# Patient Record
Sex: FEMALE | Race: OTHER | NOT HISPANIC OR LATINO
[De-identification: names, ages, dates, MRNs, and addresses within clinical notes are randomized per-mention and may not be internally consistent; named-entity substitution may affect disease eponyms.]

---

## 2017-07-24 PROBLEM — Z00.00 ENCOUNTER FOR PREVENTIVE HEALTH EXAMINATION: Status: ACTIVE | Noted: 2017-07-24

## 2019-09-17 ENCOUNTER — TRANSCRIPTION ENCOUNTER (OUTPATIENT)
Age: 29
End: 2019-09-17

## 2019-12-01 ENCOUNTER — TRANSCRIPTION ENCOUNTER (OUTPATIENT)
Age: 29
End: 2019-12-01

## 2021-06-30 ENCOUNTER — TRANSCRIPTION ENCOUNTER (OUTPATIENT)
Age: 31
End: 2021-06-30

## 2021-08-04 ENCOUNTER — TRANSCRIPTION ENCOUNTER (OUTPATIENT)
Age: 31
End: 2021-08-04

## 2021-09-01 ENCOUNTER — TRANSCRIPTION ENCOUNTER (OUTPATIENT)
Age: 31
End: 2021-09-01

## 2021-09-24 ENCOUNTER — INPATIENT (INPATIENT)
Facility: HOSPITAL | Age: 31
LOS: 4 days | Discharge: HOME | DRG: 897 | End: 2021-09-29
Attending: INTERNAL MEDICINE | Admitting: INTERNAL MEDICINE
Payer: MEDICAID

## 2021-09-24 VITALS
HEIGHT: 63 IN | WEIGHT: 169.98 LBS | RESPIRATION RATE: 16 BRPM | OXYGEN SATURATION: 99 % | SYSTOLIC BLOOD PRESSURE: 151 MMHG | TEMPERATURE: 98 F | DIASTOLIC BLOOD PRESSURE: 94 MMHG | HEART RATE: 83 BPM

## 2021-09-24 DIAGNOSIS — G43.909 MIGRAINE, UNSPECIFIED, NOT INTRACTABLE, WITHOUT STATUS MIGRAINOSUS: ICD-10-CM

## 2021-09-24 DIAGNOSIS — Z98.890 OTHER SPECIFIED POSTPROCEDURAL STATES: Chronic | ICD-10-CM

## 2021-09-24 DIAGNOSIS — Z29.9 ENCOUNTER FOR PROPHYLACTIC MEASURES, UNSPECIFIED: ICD-10-CM

## 2021-09-24 DIAGNOSIS — R74.01 ELEVATION OF LEVELS OF LIVER TRANSAMINASE LEVELS: ICD-10-CM

## 2021-09-24 DIAGNOSIS — F10.239 ALCOHOL DEPENDENCE WITH WITHDRAWAL, UNSPECIFIED: ICD-10-CM

## 2021-09-24 LAB
ALBUMIN SERPL ELPH-MCNC: 4.4 G/DL — SIGNIFICANT CHANGE UP (ref 3.5–5)
ALP SERPL-CCNC: 100 U/L — SIGNIFICANT CHANGE UP (ref 40–120)
ALT FLD-CCNC: 168 U/L DA — HIGH (ref 10–60)
ANION GAP SERPL CALC-SCNC: 26 MMOL/L — HIGH (ref 5–17)
AST SERPL-CCNC: 240 U/L — HIGH (ref 10–40)
BILIRUB SERPL-MCNC: 1.4 MG/DL — HIGH (ref 0.2–1.2)
BUN SERPL-MCNC: 10 MG/DL — SIGNIFICANT CHANGE UP (ref 7–18)
CALCIUM SERPL-MCNC: 9.7 MG/DL — SIGNIFICANT CHANGE UP (ref 8.4–10.5)
CHLORIDE SERPL-SCNC: 93 MMOL/L — LOW (ref 96–108)
CO2 SERPL-SCNC: 14 MMOL/L — LOW (ref 22–31)
CREAT SERPL-MCNC: 0.67 MG/DL — SIGNIFICANT CHANGE UP (ref 0.5–1.3)
ETHANOL SERPL-MCNC: <3 MG/DL — SIGNIFICANT CHANGE UP (ref 0–10)
GLUCOSE SERPL-MCNC: 87 MG/DL — SIGNIFICANT CHANGE UP (ref 70–99)
HCG UR QL: NEGATIVE — SIGNIFICANT CHANGE UP
HCT VFR BLD CALC: 43.2 % — SIGNIFICANT CHANGE UP (ref 34.5–45)
HGB BLD-MCNC: 14.2 G/DL — SIGNIFICANT CHANGE UP (ref 11.5–15.5)
LACTATE SERPL-SCNC: 2.2 MMOL/L — HIGH (ref 0.7–2)
LIDOCAIN IGE QN: 69 U/L — LOW (ref 73–393)
MAGNESIUM SERPL-MCNC: 1.9 MG/DL — SIGNIFICANT CHANGE UP (ref 1.6–2.6)
MCHC RBC-ENTMCNC: 32.4 PG — SIGNIFICANT CHANGE UP (ref 27–34)
MCHC RBC-ENTMCNC: 32.9 GM/DL — SIGNIFICANT CHANGE UP (ref 32–36)
MCV RBC AUTO: 98.6 FL — SIGNIFICANT CHANGE UP (ref 80–100)
NRBC # BLD: 0 /100 WBCS — SIGNIFICANT CHANGE UP (ref 0–0)
PHOSPHATE SERPL-MCNC: 4.5 MG/DL — SIGNIFICANT CHANGE UP (ref 2.5–4.5)
PLATELET # BLD AUTO: 347 K/UL — SIGNIFICANT CHANGE UP (ref 150–400)
POTASSIUM SERPL-MCNC: 4.1 MMOL/L — SIGNIFICANT CHANGE UP (ref 3.5–5.3)
POTASSIUM SERPL-SCNC: 4.1 MMOL/L — SIGNIFICANT CHANGE UP (ref 3.5–5.3)
PROT SERPL-MCNC: 9.6 G/DL — HIGH (ref 6–8.3)
RBC # BLD: 4.38 M/UL — SIGNIFICANT CHANGE UP (ref 3.8–5.2)
RBC # FLD: 12.4 % — SIGNIFICANT CHANGE UP (ref 10.3–14.5)
SARS-COV-2 RNA SPEC QL NAA+PROBE: SIGNIFICANT CHANGE UP
SODIUM SERPL-SCNC: 133 MMOL/L — LOW (ref 135–145)
WBC # BLD: 11.86 K/UL — HIGH (ref 3.8–10.5)
WBC # FLD AUTO: 11.86 K/UL — HIGH (ref 3.8–10.5)

## 2021-09-24 PROCEDURE — 76705 ECHO EXAM OF ABDOMEN: CPT | Mod: 26

## 2021-09-24 PROCEDURE — 99233 SBSQ HOSP IP/OBS HIGH 50: CPT | Mod: GC

## 2021-09-24 PROCEDURE — 99285 EMERGENCY DEPT VISIT HI MDM: CPT

## 2021-09-24 RX ORDER — FAMOTIDINE 10 MG/ML
20 INJECTION INTRAVENOUS ONCE
Refills: 0 | Status: COMPLETED | OUTPATIENT
Start: 2021-09-24 | End: 2021-09-24

## 2021-09-24 RX ORDER — SODIUM CHLORIDE 9 MG/ML
1000 INJECTION INTRAMUSCULAR; INTRAVENOUS; SUBCUTANEOUS ONCE
Refills: 0 | Status: COMPLETED | OUTPATIENT
Start: 2021-09-24 | End: 2021-09-24

## 2021-09-24 RX ORDER — DIAZEPAM 5 MG
5 TABLET ORAL ONCE
Refills: 0 | Status: DISCONTINUED | OUTPATIENT
Start: 2021-09-24 | End: 2021-09-24

## 2021-09-24 RX ORDER — SODIUM CHLORIDE 9 MG/ML
1000 INJECTION, SOLUTION INTRAVENOUS
Refills: 0 | Status: DISCONTINUED | OUTPATIENT
Start: 2021-09-24 | End: 2021-09-25

## 2021-09-24 RX ORDER — FOLIC ACID 0.8 MG
1 TABLET ORAL DAILY
Refills: 0 | Status: DISCONTINUED | OUTPATIENT
Start: 2021-09-24 | End: 2021-09-29

## 2021-09-24 RX ORDER — THIAMINE MONONITRATE (VIT B1) 100 MG
100 TABLET ORAL DAILY
Refills: 0 | Status: DISCONTINUED | OUTPATIENT
Start: 2021-09-24 | End: 2021-09-24

## 2021-09-24 RX ORDER — ONDANSETRON 8 MG/1
4 TABLET, FILM COATED ORAL ONCE
Refills: 0 | Status: COMPLETED | OUTPATIENT
Start: 2021-09-24 | End: 2021-09-24

## 2021-09-24 RX ORDER — THIAMINE MONONITRATE (VIT B1) 100 MG
500 TABLET ORAL DAILY
Refills: 0 | Status: DISCONTINUED | OUTPATIENT
Start: 2021-09-24 | End: 2021-09-26

## 2021-09-24 RX ORDER — PANTOPRAZOLE SODIUM 20 MG/1
40 TABLET, DELAYED RELEASE ORAL
Refills: 0 | Status: DISCONTINUED | OUTPATIENT
Start: 2021-09-24 | End: 2021-09-29

## 2021-09-24 RX ORDER — METOCLOPRAMIDE HCL 10 MG
10 TABLET ORAL ONCE
Refills: 0 | Status: COMPLETED | OUTPATIENT
Start: 2021-09-24 | End: 2021-09-24

## 2021-09-24 RX ORDER — ENOXAPARIN SODIUM 100 MG/ML
40 INJECTION SUBCUTANEOUS DAILY
Refills: 0 | Status: DISCONTINUED | OUTPATIENT
Start: 2021-09-24 | End: 2021-09-25

## 2021-09-24 RX ORDER — PANTOPRAZOLE SODIUM 20 MG/1
40 TABLET, DELAYED RELEASE ORAL
Refills: 0 | Status: DISCONTINUED | OUTPATIENT
Start: 2021-09-24 | End: 2021-09-24

## 2021-09-24 RX ORDER — ACETAMINOPHEN 500 MG
650 TABLET ORAL EVERY 8 HOURS
Refills: 0 | Status: DISCONTINUED | OUTPATIENT
Start: 2021-09-24 | End: 2021-09-26

## 2021-09-24 RX ADMIN — Medication 30 MILLILITER(S): at 12:34

## 2021-09-24 RX ADMIN — Medication 10 MILLIGRAM(S): at 22:40

## 2021-09-24 RX ADMIN — Medication 2 MILLIGRAM(S): at 20:10

## 2021-09-24 RX ADMIN — Medication 5 MILLIGRAM(S): at 12:59

## 2021-09-24 RX ADMIN — Medication 2 MILLIGRAM(S): at 17:37

## 2021-09-24 RX ADMIN — FAMOTIDINE 20 MILLIGRAM(S): 10 INJECTION INTRAVENOUS at 10:09

## 2021-09-24 RX ADMIN — Medication 2 MILLIGRAM(S): at 10:09

## 2021-09-24 RX ADMIN — Medication 30 MILLILITER(S): at 10:18

## 2021-09-24 RX ADMIN — Medication 2 MILLIGRAM(S): at 22:40

## 2021-09-24 RX ADMIN — SODIUM CHLORIDE 1000 MILLILITER(S): 9 INJECTION INTRAMUSCULAR; INTRAVENOUS; SUBCUTANEOUS at 10:11

## 2021-09-24 RX ADMIN — FAMOTIDINE 20 MILLIGRAM(S): 10 INJECTION INTRAVENOUS at 12:34

## 2021-09-24 RX ADMIN — ONDANSETRON 4 MILLIGRAM(S): 8 TABLET, FILM COATED ORAL at 10:09

## 2021-09-24 NOTE — ED PROVIDER NOTE - PHYSICAL EXAMINATION
GENERAL: young female, anxious  HEAD:  Atraumatic, Normocephalic  EYES: EOMI, PERRLA, conjunctiva and sclera clear  NECK: Supple, No JVD  CHEST/LUNG: Clear to auscultation bilaterally; No wheeze; No crackles; No accessory muscles used  HEART: Regular rate and rhythm; No murmurs;   ABDOMEN: Soft, Nontender, Nondistended; Bowel sounds present; No guarding  EXTREMITIES:  2+ Peripheral Pulses, No cyanosis or edema, mild tremors noted  PSYCH:  Normal Affect  NEUROLOGY: non-focal, AAO X 3. Strength is 5/5. no sensory loss.  SKIN: No rashes or lesions

## 2021-09-24 NOTE — H&P ADULT - PROBLEM SELECTOR PLAN 1
hx of heavy alcohol use  Last drink yesterday  Alcohol level <3  Follow Utox  Started on CIWA protocol with tapering doses of ativan   PRN ativan for CIWA>8  C/W folic acid, thiamin  C/w IV fluids hx of heavy alcohol use  Last drink yesterday  Alcohol level <3  Follow Utox  Started on CIWA protocol with tapering doses of ativan   PRN ativan for CIWA>8  C/W folic acid, thiamin  C/w IV fluids  Start on clear liquids, advance as tolerated. hx of heavy alcohol use  Last drink yesterday  Alcohol level <3  Follow Utox  Started on CIWA protocol with tapering doses of ativan   PRN ativan for CIWA>8  C/W folic acid, thiamine  C/w IV fluids  Start on clear liquids, advance as tolerated. hx of heavy alcohol use  Last drink yesterday  Alcohol level <3  Follow Utox  Started on CIWA protocol with tapering doses of ativan   PRN ativan for CIWA>8  lactic acidosis likely due to dehydration, f/u repeat after IV hydration   C/W folic acid, thiamine  C/w IV fluids  Start on clear liquids, advance as tolerated.

## 2021-09-24 NOTE — H&P ADULT - PROBLEM SELECTOR PLAN 3
IMPROVE VTE Individual Risk Assessment  RISK                                                                Points  [  ] Previous VTE                                                  3  [  ] Thrombophilia                                               2  [  ] Lower limb paralysis                                      2        (unable to hold up >15 seconds)    [  ] Current Cancer                                              2         (within 6 months)  [x  ] Immobilization > 24 hrs                                1  [  ] ICU/CCU stay > 24 hours                              1  [  ] Age > 60                                                      1  IMPROVE VTE Score _________1, -- for DVT proph  lovenox IMPROVE VTE Individual Risk Assessment  RISK                                                                Points  [  ] Previous VTE                                                  3  [  ] Thrombophilia                                               2  [  ] Lower limb paralysis                                      2        (unable to hold up >15 seconds)    [  ] Current Cancer                                              2         (within 6 months)  [x  ] Immobilization > 24 hrs                                1  [  ] ICU/CCU stay > 24 hours                              1  [  ] Age > 60                                                      1  IMPROVE VTE Score _________1, -- for DVT proph  lovenox  Protonix for gi ppx Will give metoclopramide 10mg for nausea and migraine  f/u for response, avoid opioids.

## 2021-09-24 NOTE — ED ADULT NURSE NOTE - NSFALLRSKHARMRISK_ED_ALL_ED
Patient (s)  given copy of dc instructions and 1 script(s). Patient (s)  verbalized understanding of instructions and script (s). Patient given a current medication reconciliation form and verbalized understanding of their medications. Patient (s) verbalized understanding of the importance of discussing medications with  his or her physician or clinic they will be following up with. Patient alert and oriented and in no acute distress. Patient discharged home ambulatory with self. no

## 2021-09-24 NOTE — ED PROVIDER NOTE - OBJECTIVE STATEMENT
30 year old female with medical history of seizures (not on any meds), Alcohol abuse presented to Ed with complains of nausea, vomiting and abdominal pain.   Patient states that she has 4 episodes of NBNB vomiting since 2 am along with R upper quadrant pain. She added that she also feels tremulous and anxious feels like having chest palpitations . Patient states that she drinks 2 Pints of liquor every day , last drink yesterday.   She also added that she wants to detox , but when she tries she has seizures. Patient states that she has 4 seizures in this year. Patient denies fever, chest pain, numbness , tingling, diarrhea or urinary problems

## 2021-09-24 NOTE — H&P ADULT - ASSESSMENT
31 y/o f with medical history of seizures (not on any meds), Alcohol abuse, myocarditis, pericarditis, presented to Ed with complains of shaking, nausea, vomiting and abdominal pain since 1 day. Admitted for alcohol withdrawal  31 y/o f with medical history of seizures (not on any meds), Alcohol abuse, myocarditis, pericarditis, presented to Ed with complains of shaking, nausea, vomiting and abdominal pain since 1 day. Admitted for alcohol withdrawal     Confirm home meds

## 2021-09-24 NOTE — H&P ADULT - HISTORY OF PRESENT ILLNESS
31 y/o f with medical history of seizures (not on any meds), Alcohol abuse, myocarditis, pericarditis, presented to Ed with complains of shaking, nausea, vomiting and abdominal pain since 1 day. Pt states she has been drinking heavily since december since her divorce.  drinks 2 Pints of liquor every day , last drink yesterday. She has been feeling very anxious and having palpitations now. She is very interested in getting detox and is motivated to stop drinking. She has had 4 alcohol withdrawal seizures this year. In childhood she has a couple of seizure, she was on antiseizure medication but stopped taking it in childhood as it made her feel bad. Pt does not want her family to know about her alcohol problem and does not want social work consulted.    31 y/o f with medical history of seizures (not on any meds), Alcohol abuse, myocarditis, pericarditis, presented to Ed with complains of shaking, nausea, vomiting and abdominal pain since 1 day. Pt states she has been drinking heavily since december since her divorce.  drinks 2 Pints of liquor every day , last drink yesterday. She has been feeling very anxious and having palpitations now. She is very interested in getting detox and is motivated to stop drinking. She has had 4 alcohol withdrawal seizures this year. In childhood she has a couple of seizure, she was on antiseizure medication but stopped taking it in childhood as it made her feel bad. Pt does not want her family to know about her alcohol problem and does not want social work consulted. She reports improvement in her abdominal pain and nausea in the ED.

## 2021-09-24 NOTE — H&P ADULT - ATTENDING COMMENTS
31yo F PMHx of alcohol abuse, seizure disorder who presents to the hospital with abdominal pain, nausea, vomiting and tremors. Patient reports heavy alcohol use for many months since her divorce. Reportedly stopped drinking 2 days ago. Has had 4 alcoholic seizures in the last year. Patient reports multiple episodes of vomiting. Leukocytosis likely stress response from vomiting, patient remains tachycardic and mildly hypertensive. Continue on CIWA protocol, with ativan taper. Start Clear liquid diets and advance diet as tolerated. Give Metoclopramide 10mg IVP for migraine. Monitor for further abdominal pain and nausea. Patient hesitant to discuss with , doesn't want family to know about her heavy drinking. Will attempt to give resources for alcohol abstinence and support groups prior to discharge.

## 2021-09-24 NOTE — H&P ADULT - REASON FOR ADMISSION
Mallampati: Class III - soft palate, base of uvula visible. ASA: Class 3 - patient with severe systemic disease. Alcohol withdrawal

## 2021-09-24 NOTE — H&P ADULT - PROBLEM SELECTOR PLAN 2
AST/ALT elevated  US abdomen: No evidence for cholelithiasis or acute cholecystitis.  Hepatomegaly and hepatic steatosis.  Continue to monitor CMP daily AST/ALT elevated  US abdomen: No evidence for cholelithiasis or acute cholecystitis.  Hepatomegaly and hepatic steatosis.  Continue to monitor CMP daily  Follow PT/INR in am

## 2021-09-24 NOTE — ED PROVIDER NOTE - CLINICAL SUMMARY MEDICAL DECISION MAKING FREE TEXT BOX
30-year-old female hx of EtOH abuse presenting in EtOH withdrawal. Given IVF/Pepcid/Maalox/Zofran, Ativan, Valium, still with persistent symptoms and inability to tolerate PO intake. Admitted to Medicine.

## 2021-09-24 NOTE — ED PROVIDER NOTE - MUSCULOSKELETAL NEGATIVE STATEMENT, MLM
Quality 128: Preventive Care And Screening: Body Mass Index (Bmi) Screening And Follow-Up Plan: BMI is documented within normal parameters and no follow-up plan is required.
Quality 111:Pneumonia Vaccination Status For Older Adults: Pneumococcal Vaccination Previously Received
Quality 431: Preventive Care And Screening: Unhealthy Alcohol Use - Screening: Patient screened for unhealthy alcohol use using a single question and scores less than 2 times per year
Detail Level: Detailed
Quality 226: Preventive Care And Screening: Tobacco Use: Screening And Cessation Intervention: Patient screened for tobacco and never smoked
Quality 130: Documentation Of Current Medications In The Medical Record: Current Medications Documented
no back pain, no gout, no musculoskeletal pain, no neck pain, and no weakness.

## 2021-09-24 NOTE — ED PROVIDER NOTE - NSICDXFAMILYHX_GEN_ALL_CORE_FT
FAMILY HISTORY:  FHx: diabetes mellitus    Mother  Still living? Unknown  FH: breast cancer, Age at diagnosis: Age Unknown

## 2021-09-25 LAB
ALBUMIN SERPL ELPH-MCNC: 3.1 G/DL — LOW (ref 3.5–5)
ALP SERPL-CCNC: 71 U/L — SIGNIFICANT CHANGE UP (ref 40–120)
ALT FLD-CCNC: 104 U/L DA — HIGH (ref 10–60)
ANION GAP SERPL CALC-SCNC: 11 MMOL/L — SIGNIFICANT CHANGE UP (ref 5–17)
APTT BLD: 29.9 SEC — SIGNIFICANT CHANGE UP (ref 27.5–35.5)
AST SERPL-CCNC: 115 U/L — HIGH (ref 10–40)
BILIRUB SERPL-MCNC: 0.6 MG/DL — SIGNIFICANT CHANGE UP (ref 0.2–1.2)
BUN SERPL-MCNC: 12 MG/DL — SIGNIFICANT CHANGE UP (ref 7–18)
CALCIUM SERPL-MCNC: 8.8 MG/DL — SIGNIFICANT CHANGE UP (ref 8.4–10.5)
CHLORIDE SERPL-SCNC: 103 MMOL/L — SIGNIFICANT CHANGE UP (ref 96–108)
CO2 SERPL-SCNC: 23 MMOL/L — SIGNIFICANT CHANGE UP (ref 22–31)
COVID-19 SPIKE DOMAIN AB INTERP: POSITIVE
COVID-19 SPIKE DOMAIN ANTIBODY RESULT: >250 U/ML — HIGH
CREAT SERPL-MCNC: 0.57 MG/DL — SIGNIFICANT CHANGE UP (ref 0.5–1.3)
GLUCOSE SERPL-MCNC: 114 MG/DL — HIGH (ref 70–99)
HCT VFR BLD CALC: 35.8 % — SIGNIFICANT CHANGE UP (ref 34.5–45)
HGB BLD-MCNC: 11.8 G/DL — SIGNIFICANT CHANGE UP (ref 11.5–15.5)
INR BLD: 0.9 RATIO — SIGNIFICANT CHANGE UP (ref 0.88–1.16)
LACTATE SERPL-SCNC: 0.6 MMOL/L — LOW (ref 0.7–2)
LACTATE SERPL-SCNC: 0.7 MMOL/L — SIGNIFICANT CHANGE UP (ref 0.7–2)
MAGNESIUM SERPL-MCNC: 2.1 MG/DL — SIGNIFICANT CHANGE UP (ref 1.6–2.6)
MCHC RBC-ENTMCNC: 32.5 PG — SIGNIFICANT CHANGE UP (ref 27–34)
MCHC RBC-ENTMCNC: 33 GM/DL — SIGNIFICANT CHANGE UP (ref 32–36)
MCV RBC AUTO: 98.6 FL — SIGNIFICANT CHANGE UP (ref 80–100)
NRBC # BLD: 0 /100 WBCS — SIGNIFICANT CHANGE UP (ref 0–0)
PHOSPHATE SERPL-MCNC: 2.4 MG/DL — LOW (ref 2.5–4.5)
PLATELET # BLD AUTO: 244 K/UL — SIGNIFICANT CHANGE UP (ref 150–400)
POTASSIUM SERPL-MCNC: 3.9 MMOL/L — SIGNIFICANT CHANGE UP (ref 3.5–5.3)
POTASSIUM SERPL-SCNC: 3.9 MMOL/L — SIGNIFICANT CHANGE UP (ref 3.5–5.3)
PROT SERPL-MCNC: 6.9 G/DL — SIGNIFICANT CHANGE UP (ref 6–8.3)
PROTHROM AB SERPL-ACNC: 10.8 SEC — SIGNIFICANT CHANGE UP (ref 10.6–13.6)
RBC # BLD: 3.63 M/UL — LOW (ref 3.8–5.2)
RBC # FLD: 12.4 % — SIGNIFICANT CHANGE UP (ref 10.3–14.5)
SARS-COV-2 IGG+IGM SERPL QL IA: >250 U/ML — HIGH
SARS-COV-2 IGG+IGM SERPL QL IA: POSITIVE
SODIUM SERPL-SCNC: 137 MMOL/L — SIGNIFICANT CHANGE UP (ref 135–145)
WBC # BLD: 4.45 K/UL — SIGNIFICANT CHANGE UP (ref 3.8–10.5)
WBC # FLD AUTO: 4.45 K/UL — SIGNIFICANT CHANGE UP (ref 3.8–10.5)

## 2021-09-25 PROCEDURE — 99233 SBSQ HOSP IP/OBS HIGH 50: CPT | Mod: GC

## 2021-09-25 RX ORDER — KETOROLAC TROMETHAMINE 30 MG/ML
15 SYRINGE (ML) INJECTION ONCE
Refills: 0 | Status: DISCONTINUED | OUTPATIENT
Start: 2021-09-25 | End: 2021-09-25

## 2021-09-25 RX ORDER — KETOROLAC TROMETHAMINE 30 MG/ML
30 SYRINGE (ML) INJECTION ONCE
Refills: 0 | Status: DISCONTINUED | OUTPATIENT
Start: 2021-09-25 | End: 2021-09-25

## 2021-09-25 RX ORDER — DIPHENHYDRAMINE HCL 50 MG
25 CAPSULE ORAL ONCE
Refills: 0 | Status: COMPLETED | OUTPATIENT
Start: 2021-09-25 | End: 2021-09-25

## 2021-09-25 RX ADMIN — Medication 15 MILLIGRAM(S): at 13:36

## 2021-09-25 RX ADMIN — Medication 2 MILLIGRAM(S): at 06:06

## 2021-09-25 RX ADMIN — Medication 2 MILLIGRAM(S): at 10:40

## 2021-09-25 RX ADMIN — SODIUM CHLORIDE 100 MILLILITER(S): 9 INJECTION, SOLUTION INTRAVENOUS at 00:47

## 2021-09-25 RX ADMIN — Medication 1.5 MILLIGRAM(S): at 21:59

## 2021-09-25 RX ADMIN — Medication 25 MILLIGRAM(S): at 00:46

## 2021-09-25 RX ADMIN — Medication 1.5 MILLIGRAM(S): at 17:56

## 2021-09-25 RX ADMIN — Medication 105 MILLIGRAM(S): at 13:12

## 2021-09-25 RX ADMIN — Medication 15 MILLIGRAM(S): at 14:30

## 2021-09-25 RX ADMIN — Medication 2 MILLIGRAM(S): at 01:07

## 2021-09-25 RX ADMIN — Medication 105 MILLIGRAM(S): at 00:47

## 2021-09-25 RX ADMIN — Medication 1.5 MILLIGRAM(S): at 13:37

## 2021-09-25 RX ADMIN — Medication 30 MILLIGRAM(S): at 00:46

## 2021-09-25 RX ADMIN — Medication 1 MILLIGRAM(S): at 12:23

## 2021-09-25 NOTE — PROGRESS NOTE ADULT - PROBLEM SELECTOR PLAN 1
hx of heavy alcohol use  Last drink yesterday  Alcohol level <3  Follow Utox  Started on CIWA protocol with tapering doses of ativan   PRN ativan for CIWA>8  lactic acidosis normalized  C/W folic acid, thiamine  No longer needs fluids  Advance to regular diet

## 2021-09-26 LAB
ALBUMIN SERPL ELPH-MCNC: 3.3 G/DL — LOW (ref 3.5–5)
ALP SERPL-CCNC: 72 U/L — SIGNIFICANT CHANGE UP (ref 40–120)
ALT FLD-CCNC: 130 U/L DA — HIGH (ref 10–60)
ANION GAP SERPL CALC-SCNC: 11 MMOL/L — SIGNIFICANT CHANGE UP (ref 5–17)
AST SERPL-CCNC: 157 U/L — HIGH (ref 10–40)
BILIRUB SERPL-MCNC: 0.7 MG/DL — SIGNIFICANT CHANGE UP (ref 0.2–1.2)
BUN SERPL-MCNC: 9 MG/DL — SIGNIFICANT CHANGE UP (ref 7–18)
CALCIUM SERPL-MCNC: 9.2 MG/DL — SIGNIFICANT CHANGE UP (ref 8.4–10.5)
CHLORIDE SERPL-SCNC: 104 MMOL/L — SIGNIFICANT CHANGE UP (ref 96–108)
CO2 SERPL-SCNC: 21 MMOL/L — LOW (ref 22–31)
CREAT SERPL-MCNC: 0.42 MG/DL — LOW (ref 0.5–1.3)
GLUCOSE SERPL-MCNC: 87 MG/DL — SIGNIFICANT CHANGE UP (ref 70–99)
HCT VFR BLD CALC: 35.4 % — SIGNIFICANT CHANGE UP (ref 34.5–45)
HGB BLD-MCNC: 12 G/DL — SIGNIFICANT CHANGE UP (ref 11.5–15.5)
MAGNESIUM SERPL-MCNC: 1.8 MG/DL — SIGNIFICANT CHANGE UP (ref 1.6–2.6)
MCHC RBC-ENTMCNC: 33.1 PG — SIGNIFICANT CHANGE UP (ref 27–34)
MCHC RBC-ENTMCNC: 33.9 GM/DL — SIGNIFICANT CHANGE UP (ref 32–36)
MCV RBC AUTO: 97.5 FL — SIGNIFICANT CHANGE UP (ref 80–100)
NRBC # BLD: 0 /100 WBCS — SIGNIFICANT CHANGE UP (ref 0–0)
PHOSPHATE SERPL-MCNC: 2.1 MG/DL — LOW (ref 2.5–4.5)
PLATELET # BLD AUTO: 243 K/UL — SIGNIFICANT CHANGE UP (ref 150–400)
POTASSIUM SERPL-MCNC: 3.6 MMOL/L — SIGNIFICANT CHANGE UP (ref 3.5–5.3)
POTASSIUM SERPL-SCNC: 3.6 MMOL/L — SIGNIFICANT CHANGE UP (ref 3.5–5.3)
PROT SERPL-MCNC: 7.1 G/DL — SIGNIFICANT CHANGE UP (ref 6–8.3)
RBC # BLD: 3.63 M/UL — LOW (ref 3.8–5.2)
RBC # FLD: 12.1 % — SIGNIFICANT CHANGE UP (ref 10.3–14.5)
SODIUM SERPL-SCNC: 136 MMOL/L — SIGNIFICANT CHANGE UP (ref 135–145)
WBC # BLD: 5.36 K/UL — SIGNIFICANT CHANGE UP (ref 3.8–10.5)
WBC # FLD AUTO: 5.36 K/UL — SIGNIFICANT CHANGE UP (ref 3.8–10.5)

## 2021-09-26 PROCEDURE — 93010 ELECTROCARDIOGRAM REPORT: CPT

## 2021-09-26 PROCEDURE — 99233 SBSQ HOSP IP/OBS HIGH 50: CPT | Mod: GC

## 2021-09-26 RX ORDER — DIPHENHYDRAMINE HCL 50 MG
25 CAPSULE ORAL ONCE
Refills: 0 | Status: COMPLETED | OUTPATIENT
Start: 2021-09-26 | End: 2021-09-26

## 2021-09-26 RX ORDER — METOCLOPRAMIDE HCL 10 MG
10 TABLET ORAL ONCE
Refills: 0 | Status: COMPLETED | OUTPATIENT
Start: 2021-09-26 | End: 2021-09-26

## 2021-09-26 RX ORDER — ACETAMINOPHEN 500 MG
650 TABLET ORAL EVERY 8 HOURS
Refills: 0 | Status: DISCONTINUED | OUTPATIENT
Start: 2021-09-26 | End: 2021-09-29

## 2021-09-26 RX ORDER — THIAMINE MONONITRATE (VIT B1) 100 MG
100 TABLET ORAL DAILY
Refills: 0 | Status: DISCONTINUED | OUTPATIENT
Start: 2021-09-26 | End: 2021-09-29

## 2021-09-26 RX ADMIN — PANTOPRAZOLE SODIUM 40 MILLIGRAM(S): 20 TABLET, DELAYED RELEASE ORAL at 05:41

## 2021-09-26 RX ADMIN — Medication 2 MILLIGRAM(S): at 20:18

## 2021-09-26 RX ADMIN — Medication 650 MILLIGRAM(S): at 22:00

## 2021-09-26 RX ADMIN — Medication 2 MILLIGRAM(S): at 23:40

## 2021-09-26 RX ADMIN — Medication 10 MILLIGRAM(S): at 12:06

## 2021-09-26 RX ADMIN — Medication 15 MILLIGRAM(S): at 01:23

## 2021-09-26 RX ADMIN — Medication 1.5 MILLIGRAM(S): at 05:36

## 2021-09-26 RX ADMIN — Medication 2 MILLIGRAM(S): at 00:16

## 2021-09-26 RX ADMIN — Medication 2 MILLIGRAM(S): at 12:06

## 2021-09-26 RX ADMIN — Medication 15 MILLIGRAM(S): at 00:17

## 2021-09-26 RX ADMIN — Medication 1 MILLIGRAM(S): at 12:05

## 2021-09-26 RX ADMIN — Medication 100 MILLIGRAM(S): at 12:09

## 2021-09-26 RX ADMIN — Medication 1.5 MILLIGRAM(S): at 11:14

## 2021-09-26 RX ADMIN — Medication 2 MILLIGRAM(S): at 17:13

## 2021-09-26 RX ADMIN — Medication 25 MILLIGRAM(S): at 12:05

## 2021-09-26 RX ADMIN — Medication 650 MILLIGRAM(S): at 21:46

## 2021-09-26 NOTE — PROGRESS NOTE ADULT - PROBLEM SELECTOR PLAN 1
hx of heavy alcohol use  Last drink yesterday  Alcohol level <3  Follow Utox  Started on CIWA protocol with tapering doses of ativan   PRN ativan for CIWA>8  lactic acidosis likely due to dehydration, corrected after fluids  C/W folic acid, thiamine hx of heavy alcohol use  Last drink yesterday  Alcohol level <3  Follow Utox  Patient still has active withdrawal symptoms  Ativan 2mg q6h  PRN ativan for CIWA>8  lactic acidosis likely due to dehydration, corrected after fluids  C/W folic acid, thiamine

## 2021-09-27 LAB
ALBUMIN SERPL ELPH-MCNC: 3.4 G/DL — LOW (ref 3.5–5)
ALP SERPL-CCNC: 69 U/L — SIGNIFICANT CHANGE UP (ref 40–120)
ALT FLD-CCNC: 128 U/L DA — HIGH (ref 10–60)
ANION GAP SERPL CALC-SCNC: 10 MMOL/L — SIGNIFICANT CHANGE UP (ref 5–17)
AST SERPL-CCNC: 123 U/L — HIGH (ref 10–40)
BILIRUB SERPL-MCNC: 0.7 MG/DL — SIGNIFICANT CHANGE UP (ref 0.2–1.2)
BUN SERPL-MCNC: 6 MG/DL — LOW (ref 7–18)
CALCIUM SERPL-MCNC: 9.1 MG/DL — SIGNIFICANT CHANGE UP (ref 8.4–10.5)
CHLORIDE SERPL-SCNC: 102 MMOL/L — SIGNIFICANT CHANGE UP (ref 96–108)
CO2 SERPL-SCNC: 25 MMOL/L — SIGNIFICANT CHANGE UP (ref 22–31)
CREAT SERPL-MCNC: 0.5 MG/DL — SIGNIFICANT CHANGE UP (ref 0.5–1.3)
GLUCOSE SERPL-MCNC: 91 MG/DL — SIGNIFICANT CHANGE UP (ref 70–99)
HCT VFR BLD CALC: 35.2 % — SIGNIFICANT CHANGE UP (ref 34.5–45)
HGB BLD-MCNC: 12 G/DL — SIGNIFICANT CHANGE UP (ref 11.5–15.5)
MAGNESIUM SERPL-MCNC: 1.7 MG/DL — SIGNIFICANT CHANGE UP (ref 1.6–2.6)
MCHC RBC-ENTMCNC: 33.2 PG — SIGNIFICANT CHANGE UP (ref 27–34)
MCHC RBC-ENTMCNC: 34.1 GM/DL — SIGNIFICANT CHANGE UP (ref 32–36)
MCV RBC AUTO: 97.5 FL — SIGNIFICANT CHANGE UP (ref 80–100)
NRBC # BLD: 0 /100 WBCS — SIGNIFICANT CHANGE UP (ref 0–0)
PHOSPHATE SERPL-MCNC: 3.4 MG/DL — SIGNIFICANT CHANGE UP (ref 2.5–4.5)
PLATELET # BLD AUTO: 253 K/UL — SIGNIFICANT CHANGE UP (ref 150–400)
POTASSIUM SERPL-MCNC: 3.5 MMOL/L — SIGNIFICANT CHANGE UP (ref 3.5–5.3)
POTASSIUM SERPL-SCNC: 3.5 MMOL/L — SIGNIFICANT CHANGE UP (ref 3.5–5.3)
PROT SERPL-MCNC: 7 G/DL — SIGNIFICANT CHANGE UP (ref 6–8.3)
RBC # BLD: 3.61 M/UL — LOW (ref 3.8–5.2)
RBC # FLD: 12 % — SIGNIFICANT CHANGE UP (ref 10.3–14.5)
SODIUM SERPL-SCNC: 137 MMOL/L — SIGNIFICANT CHANGE UP (ref 135–145)
WBC # BLD: 6.52 K/UL — SIGNIFICANT CHANGE UP (ref 3.8–10.5)
WBC # FLD AUTO: 6.52 K/UL — SIGNIFICANT CHANGE UP (ref 3.8–10.5)

## 2021-09-27 PROCEDURE — 99233 SBSQ HOSP IP/OBS HIGH 50: CPT | Mod: GC

## 2021-09-27 RX ORDER — HYDROXYZINE HCL 10 MG
25 TABLET ORAL DAILY
Refills: 0 | Status: DISCONTINUED | OUTPATIENT
Start: 2021-09-27 | End: 2021-09-29

## 2021-09-27 RX ADMIN — Medication 1 MILLIGRAM(S): at 11:54

## 2021-09-27 RX ADMIN — Medication 25 MILLIGRAM(S): at 19:07

## 2021-09-27 RX ADMIN — Medication 650 MILLIGRAM(S): at 06:30

## 2021-09-27 RX ADMIN — Medication 2 MILLIGRAM(S): at 15:34

## 2021-09-27 RX ADMIN — Medication 2 MILLIGRAM(S): at 19:07

## 2021-09-27 RX ADMIN — Medication 2 MILLIGRAM(S): at 20:41

## 2021-09-27 RX ADMIN — Medication 2 MILLIGRAM(S): at 12:27

## 2021-09-27 RX ADMIN — Medication 650 MILLIGRAM(S): at 06:03

## 2021-09-27 RX ADMIN — Medication 2 MILLIGRAM(S): at 01:49

## 2021-09-27 RX ADMIN — PANTOPRAZOLE SODIUM 40 MILLIGRAM(S): 20 TABLET, DELAYED RELEASE ORAL at 06:04

## 2021-09-27 RX ADMIN — Medication 2 MILLIGRAM(S): at 08:56

## 2021-09-27 RX ADMIN — Medication 1 TABLET(S): at 11:55

## 2021-09-27 RX ADMIN — Medication 2 MILLIGRAM(S): at 06:03

## 2021-09-27 RX ADMIN — Medication 100 MILLIGRAM(S): at 11:54

## 2021-09-27 NOTE — PROGRESS NOTE ADULT - PROBLEM SELECTOR PLAN 1
hx of heavy alcohol use  Last drink the day before admission  Alcohol level <3  F/u Utox  Patient still has active withdrawal symptoms  Ativan 2mg q6h  PRN ativan for CIWA>7  lactic acidosis likely due to dehydration, corrected after fluids  C/W folic acid, thiamine

## 2021-09-27 NOTE — SBIRT NOTE ADULT - NSSBIRTDRGBRIEFINTDET_GEN_A_CORE
SW educated patient of the harms of illegal substance use and the negative effects of using more than one drug at one time.

## 2021-09-27 NOTE — SBIRT NOTE ADULT - NSSBIRTALCPOSREINDET_GEN_A_CORE
Patient declined referral to outpatient substance abuse treatment. Patient reported she would like to do more of a holistic measure of treatment. Patient accepted referrals to outpatient mental health treatment, as she feels she would benefit from talk therapy to help cope with her divorce. SW educated patient on the harms of excessive alcohol use.

## 2021-09-28 ENCOUNTER — TRANSCRIPTION ENCOUNTER (OUTPATIENT)
Age: 31
End: 2021-09-28

## 2021-09-28 PROCEDURE — 99233 SBSQ HOSP IP/OBS HIGH 50: CPT | Mod: GC

## 2021-09-28 RX ORDER — ALPRAZOLAM 0.25 MG
0.5 TABLET ORAL ONCE
Refills: 0 | Status: DISCONTINUED | OUTPATIENT
Start: 2021-09-28 | End: 2021-09-28

## 2021-09-28 RX ORDER — QUETIAPINE FUMARATE 200 MG/1
25 TABLET, FILM COATED ORAL AT BEDTIME
Refills: 0 | Status: DISCONTINUED | OUTPATIENT
Start: 2021-09-28 | End: 2021-09-29

## 2021-09-28 RX ADMIN — Medication 650 MILLIGRAM(S): at 21:18

## 2021-09-28 RX ADMIN — Medication 0.5 MILLIGRAM(S): at 18:06

## 2021-09-28 RX ADMIN — Medication 100 MILLIGRAM(S): at 14:07

## 2021-09-28 RX ADMIN — Medication 0.5 MILLIGRAM(S): at 09:01

## 2021-09-28 RX ADMIN — Medication 2 MILLIGRAM(S): at 05:38

## 2021-09-28 RX ADMIN — Medication 2 MILLIGRAM(S): at 00:28

## 2021-09-28 RX ADMIN — Medication 650 MILLIGRAM(S): at 20:48

## 2021-09-28 RX ADMIN — Medication 25 MILLIGRAM(S): at 14:06

## 2021-09-28 RX ADMIN — Medication 1 MILLIGRAM(S): at 14:06

## 2021-09-28 RX ADMIN — Medication 650 MILLIGRAM(S): at 05:38

## 2021-09-28 RX ADMIN — Medication 2 MILLIGRAM(S): at 14:07

## 2021-09-28 RX ADMIN — Medication 2 MILLIGRAM(S): at 09:40

## 2021-09-28 RX ADMIN — QUETIAPINE FUMARATE 25 MILLIGRAM(S): 200 TABLET, FILM COATED ORAL at 22:32

## 2021-09-28 RX ADMIN — Medication 2 MILLIGRAM(S): at 20:48

## 2021-09-28 RX ADMIN — Medication 1 TABLET(S): at 14:07

## 2021-09-28 NOTE — PROGRESS NOTE ADULT - PROBLEM SELECTOR PLAN 4
IMPROVE VTE Individual Risk Assessment  RISK                                                                Points  [  ] Previous VTE                                                  3  [  ] Thrombophilia                                               2  [  ] Lower limb paralysis                                      2        (unable to hold up >15 seconds)    [  ] Current Cancer                                              2         (within 6 months)  [  ] Immobilization > 24 hrs                                1  [  ] ICU/CCU stay > 24 hours                              1  [  ] Age > 60                                                      1  IMPROVE VTE Score ____1_____    PPI for GI prophylaxis
hold enoxaparin as patient reported dark emesis prior to arrival  monitor Hgb and stools

## 2021-09-28 NOTE — DISCHARGE NOTE PROVIDER - CARE PROVIDER_API CALL
Mariah Hameed)  Psychiatry  [phone] [place] [address]  Phone: [phone]  Fax: [phone]  Follow Up Time:     Clare Alex ()  Medicine  [facility]  [address] [facility], [address]  [place], [place] [id number]  Phone: [phone]  Fax: [phone]  Follow Up Time:

## 2021-09-28 NOTE — PROGRESS NOTE ADULT - SUBJECTIVE AND OBJECTIVE BOX
PGY-1 Progress Note discussed with attending    PAGER #: [[phone]] TILL 5:00 PM  PLEASE CONTACT ON CALL TEAM:  - On Call Team (Please refer to [facility]) FROM 5:00 PM - 8:30PM  - Nightfloat Team FROM 8:30 -7:30 AM    CHIEF COMPLAINT & BRIEF HOSPITAL COURSE:  31 y/o f with medical history of seizures (not on any meds), Alcohol abuse, myocarditis, pericarditis, presented to Ed with complains of shaking, nausea, vomiting and abdominal pain since 1 day. Pt states she has been drinking heavily since december since her divorce.  drinks 2 Pints of liquor every day , last drink yesterday. She has been feeling very anxious and having palpitations now. She is very interested in getting detox and is motivated to stop drinking. She has had 4 alcohol withdrawal seizures this year. In childhood she has a couple of seizure, she was on antiseizure medication but stopped taking it in childhood as it made her feel bad. Pt does not want her family to know about her alcohol problem and does not want social work consulted. She reports improvement in her abdominal pain and nausea in the ED.    INTERVAL HPI/OVERNIGHT EVENTS:   Pt continues to complain of anxiety, diaphoresis. CIWA was 6, per RN. Tremors were observed on Physical exam. Resumed Ativan 2mg q6hrs standing dose. Xanax 0.5mg IV was given as a one time dose    REVIEW OF SYSTEMS:  CONSTITUTIONAL: Diaphoretic, No fever, weight loss, or fatigue  RESPIRATORY: No cough, wheezing, chills or hemoptysis, NO SOB  CARDIOVASCULAR: no chest pain or palpitations. No dizziness, or leg swelling  GASTROINTESTINAL: No abdominal pain. No nausea, vomiting, or hematemesis; No diarrhea or constipation. No melena or hematochezia.  GENITOURINARY: No dysuria or hematuria, urinary frequency  NEUROLOGICAL: +ve Tremors, No headaches, memory loss, loss of strength, numbness  SKIN: No itching, burning, rashes, or lesions     Vital Signs Last 24 Hrs  T(C): 36.3 (26 Sep 2021 05:05), Max: 37 (25 Sep 2021 14:02)  T(F): 97.4 (26 Sep 2021 05:05), Max: 98.6 (25 Sep 2021 14:02)  HR: 82 (26 Sep 2021 05:05) (82 - 85)  BP: 117/83 (26 Sep 2021 05:05) (117/83 - 121/83)  BP(mean): --  RR: 16 (26 Sep 2021 05:05) (16 - 18)  SpO2: 99% (26 Sep 2021 05:05) (99% - 100%)    PHYSICAL EXAMINATION:  GENERAL: In acute distress, well built  HEAD:  Atraumatic, Normocephalic  EYES:  conjunctiva and sclera clear  NECK: Supple, No JVD, Normal thyroid  CHEST/LUNG: Clear to auscultation. Clear to percussion bilaterally; No rales, rhonchi, wheezing, or rubs  HEART: increased rate, regular rhythm; No murmurs, rubs, or gallops  ABDOMEN: Soft, Nontender, Nondistended; Bowel sounds present  NERVOUS SYSTEM:  Alert & Oriented X3,    EXTREMITIES:  2+ Peripheral Pulses, No clubbing, cyanosis, or edema  SKIN: warm dry                          12.0   5.36  )-----------( 243      ( 26 Sep 2021 08:52 )             35.4     09-26    136  |  104  |  9   ----------------------------<  87  3.6   |  21<L>  |  0.42<L>    Ca    9.2      26 Sep 2021 08:52  Phos  2.1     09-26  Mg     1.8     09-26    TPro  7.1  /  Alb  3.3<L>  /  TBili  0.7  /  DBili  x   /  AST  157<H>  /  ALT  130<H>  /  AlkPhos  72  09-26    LIVER FUNCTIONS - ( 26 Sep 2021 08:52 )  Alb: 3.3 g/dL / Pro: 7.1 g/dL / ALK PHOS: 72 U/L / ALT: 130 U/L DA / AST: 157 U/L / GGT: x               PT/INR - ( 25 Sep 2021 07:38 )   PT: 10.8 sec;   INR: 0.90 ratio         PTT - ( 25 Sep 2021 07:38 )  PTT:29.9 sec    CAPILLARY BLOOD GLUCOSE      RADIOLOGY & ADDITIONAL TESTS:                  
PGY-1 Progress Note discussed with attending    PAGER #: [[phone]] TILL 5:00 PM  PLEASE CONTACT ON CALL TEAM:  - On Call Team (Please refer to [facility]) FROM 5:00 PM - 8:30PM  - Nightfloat Team FROM 8:30 -7:30 AM    CHIEF COMPLAINT & BRIEF HOSPITAL COURSE:  29 y/o f with medical history of seizures (not on any meds), Alcohol abuse, myocarditis, pericarditis, presented to Ed with complains of shaking, nausea, vomiting and abdominal pain since 1 day. Pt states she has been drinking heavily since december since her divorce.  drinks 2 Pints of liquor every day , last drink yesterday. She has been feeling very anxious and having palpitations now. She is very interested in getting detox and is motivated to stop drinking. She has had 4 alcohol withdrawal seizures this year. In childhood she has a couple of seizure, she was on antiseizure medication but stopped taking it in childhood as it made her feel bad. Pt does not want her family to know about her alcohol problem and does not want social work consulted. She reports improvement in her abdominal pain and nausea in the ED.    INTERVAL HPI/OVERNIGHT EVENTS:   Pt complains of pain and chest tightness, diaphoresis. EKG showed sinus rhythm    REVIEW OF SYSTEMS:  CONSTITUTIONAL: No fever, weight loss, or fatigue  RESPIRATORY: +ve SOB when anxious, No cough, wheezing, chills or hemoptysi  CARDIOVASCULAR: +ve chest pain and palpitations when anxious. No dizziness, or leg swelling  GASTROINTESTINAL: No abdominal pain. No nausea, vomiting, or hematemesis; No diarrhea or constipation. No melena or hematochezia.  GENITOURINARY: No dysuria or hematuria, urinary frequency  NEUROLOGICAL: No headaches, memory loss, loss of strength, numbness, or tremors  SKIN: No itching, burning, rashes, or lesions     Vital Signs Last 24 Hrs  T(C): 36.3 (26 Sep 2021 05:05), Max: 37 (25 Sep 2021 14:02)  T(F): 97.4 (26 Sep 2021 05:05), Max: 98.6 (25 Sep 2021 14:02)  HR: 82 (26 Sep 2021 05:05) (82 - 85)  BP: 117/83 (26 Sep 2021 05:05) (117/83 - 121/83)  BP(mean): --  RR: 16 (26 Sep 2021 05:05) (16 - 18)  SpO2: 99% (26 Sep 2021 05:05) (99% - 100%)    PHYSICAL EXAMINATION:  GENERAL: NAD, well built  HEAD:  Atraumatic, Normocephalic  EYES:  conjunctiva and sclera clear  NECK: Supple, No JVD, Normal thyroid  CHEST/LUNG: Clear to auscultation. Clear to percussion bilaterally; No rales, rhonchi, wheezing, or rubs  HEART: increased rate, regular rhythm; No murmurs, rubs, or gallops  ABDOMEN: Soft, Nontender, Nondistended; Bowel sounds present  NERVOUS SYSTEM:  Alert & Oriented X3,    EXTREMITIES:  2+ Peripheral Pulses, No clubbing, cyanosis, or edema  SKIN: warm dry                          12.0   5.36  )-----------( 243      ( 26 Sep 2021 08:52 )             35.4     09-26    136  |  104  |  9   ----------------------------<  87  3.6   |  21<L>  |  0.42<L>    Ca    9.2      26 Sep 2021 08:52  Phos  2.1     09-26  Mg     1.8     09-26    TPro  7.1  /  Alb  3.3<L>  /  TBili  0.7  /  DBili  x   /  AST  157<H>  /  ALT  130<H>  /  AlkPhos  72  09-26    LIVER FUNCTIONS - ( 26 Sep 2021 08:52 )  Alb: 3.3 g/dL / Pro: 7.1 g/dL / ALK PHOS: 72 U/L / ALT: 130 U/L DA / AST: 157 U/L / GGT: x               PT/INR - ( 25 Sep 2021 07:38 )   PT: 10.8 sec;   INR: 0.90 ratio         PTT - ( 25 Sep 2021 07:38 )  PTT:29.9 sec    CAPILLARY BLOOD GLUCOSE      RADIOLOGY & ADDITIONAL TESTS:                  
S: Patient sleeping today. Reports feeling anxious and some tremors. Denies hallucinations (visual or auditory). Reports resolution of migraine after metoclopramide.    O:  Vital Signs Last 24 Hrs  T(C): 37 (25 Sep 2021 14:02), Max: 37 (25 Sep 2021 14:02)  T(F): 98.6 (25 Sep 2021 14:02), Max: 98.6 (25 Sep 2021 14:02)  HR: 85 (25 Sep 2021 14:02) (83 - 98)  BP: 118/71 (25 Sep 2021 14:02) (115/70 - 119/76)  BP(mean): --  RR: 18 (25 Sep 2021 14:02) (18 - 18)  SpO2: 100% (25 Sep 2021 14:02) (98% - 100%)    GENERAL: NAD, well-developed  HEAD:  Atraumatic, Normocephalic  EYES: EOMI, PERRLA, conjunctiva and sclera clear  NECK: Supple, No JVD  CHEST/LUNG: Clear to auscultation bilaterally; No wheeze  HEART: Regular rate and rhythm; No murmurs, rubs, or gallops  ABDOMEN: Soft, Nontender, Nondistended; Bowel sounds present  EXTREMITIES:  2+ Peripheral Pulses, No clubbing, cyanosis, or edema  PSYCH: AAOx3  NEUROLOGY: non-focal, mild tremors bilaterally  SKIN: No rashes or lesions    acetaminophen   Tablet .. 650 milliGRAM(s) Oral every 8 hours PRN  aluminum hydroxide/magnesium hydroxide/simethicone Suspension 30 milliLiter(s) Oral every 4 hours PRN  dextrose 5% + sodium chloride 0.9%. 1000 milliLiter(s) IV Continuous <Continuous>  enoxaparin Injectable 40 milliGRAM(s) SubCutaneous daily  folic acid 1 milliGRAM(s) Oral daily  LORazepam   Injectable 1.5 milliGRAM(s) IV Push every 4 hours  LORazepam   Injectable 2 milliGRAM(s) IV Push every 2 hours PRN  LORazepam   Injectable   IV Push   pantoprazole    Tablet 40 milliGRAM(s) Oral before breakfast  thiamine IVPB 500 milliGRAM(s) IV Intermittent daily                            11.8   4.45  )-----------( 244      ( 25 Sep 2021 07:38 )             35.8       09-25    137  |  103  |  12  ----------------------------<  114<H>  3.9   |  23  |  0.57    Ca    8.8      25 Sep 2021 07:38  Phos  2.4     09-25  Mg     2.1     09-25    TPro  6.9  /  Alb  3.1<L>  /  TBili  0.6  /  DBili  x   /  AST  115<H>  /  ALT  104<H>  /  AlkPhos  71  09-25  
PGY-1 Progress Note discussed with attending    PAGER #: [[phone]] TILL 5:00 PM  PLEASE CONTACT ON CALL TEAM:  - On Call Team (Please refer to [facility]) FROM 5:00 PM - 8:30PM  - Nightfloat Team FROM 8:30 -7:30 AM    CHIEF COMPLAINT & BRIEF HOSPITAL COURSE:  31 y/o f with medical history of seizures (not on any meds), Alcohol abuse, myocarditis, pericarditis, presented to Ed with complains of shaking, nausea, vomiting and abdominal pain since 1 day. Pt states she has been drinking heavily since december since her divorce.  drinks 2 Pints of liquor every day , last drink yesterday. She has been feeling very anxious and having palpitations now. She is very interested in getting detox and is motivated to stop drinking. She has had 4 alcohol withdrawal seizures this year. In childhood she has a couple of seizure, she was on antiseizure medication but stopped taking it in childhood as it made her feel bad. Pt does not want her family to know about her alcohol problem and does not want social work consulted. She reports improvement in her abdominal pain and nausea in the ED.    INTERVAL HPI/OVERNIGHT EVENTS:   Pt complained of anxiety, diaphoresis. CIWA was 6, per RN. Resumed Ativan 2mg q6hrs standing dose.    REVIEW OF SYSTEMS:  CONSTITUTIONAL: Diaphoretic, No fever, weight loss, or fatigue  RESPIRATORY: No cough, wheezing, chills or hemoptysis, NO SOB  CARDIOVASCULAR: no chest pain or palpitations. No dizziness, or leg swelling  GASTROINTESTINAL: No abdominal pain. No nausea, vomiting, or hematemesis; No diarrhea or constipation. No melena or hematochezia.  GENITOURINARY: No dysuria or hematuria, urinary frequency  NEUROLOGICAL: +ve Tremors, No headaches, memory loss, loss of strength, numbness  SKIN: No itching, burning, rashes, or lesions     Vital Signs Last 24 Hrs  T(C): 36.3 (26 Sep 2021 05:05), Max: 37 (25 Sep 2021 14:02)  T(F): 97.4 (26 Sep 2021 05:05), Max: 98.6 (25 Sep 2021 14:02)  HR: 82 (26 Sep 2021 05:05) (82 - 85)  BP: 117/83 (26 Sep 2021 05:05) (117/83 - 121/83)  BP(mean): --  RR: 16 (26 Sep 2021 05:05) (16 - 18)  SpO2: 99% (26 Sep 2021 05:05) (99% - 100%)    PHYSICAL EXAMINATION:  GENERAL: In acute distress, well built  HEAD:  Atraumatic, Normocephalic  EYES:  conjunctiva and sclera clear  NECK: Supple, No JVD, Normal thyroid  CHEST/LUNG: Clear to auscultation. Clear to percussion bilaterally; No rales, rhonchi, wheezing, or rubs  HEART: increased rate, regular rhythm; No murmurs, rubs, or gallops  ABDOMEN: Soft, Nontender, Nondistended; Bowel sounds present  NERVOUS SYSTEM:  Alert & Oriented X3,    EXTREMITIES:  2+ Peripheral Pulses, No clubbing, cyanosis, or edema  SKIN: warm dry                          12.0   5.36  )-----------( 243      ( 26 Sep 2021 08:52 )             35.4     09-26    136  |  104  |  9   ----------------------------<  87  3.6   |  21<L>  |  0.42<L>    Ca    9.2      26 Sep 2021 08:52  Phos  2.1     09-26  Mg     1.8     09-26    TPro  7.1  /  Alb  3.3<L>  /  TBili  0.7  /  DBili  x   /  AST  157<H>  /  ALT  130<H>  /  AlkPhos  72  09-26    LIVER FUNCTIONS - ( 26 Sep 2021 08:52 )  Alb: 3.3 g/dL / Pro: 7.1 g/dL / ALK PHOS: 72 U/L / ALT: 130 U/L DA / AST: 157 U/L / GGT: x               PT/INR - ( 25 Sep 2021 07:38 )   PT: 10.8 sec;   INR: 0.90 ratio         PTT - ( 25 Sep 2021 07:38 )  PTT:29.9 sec    CAPILLARY BLOOD GLUCOSE      RADIOLOGY & ADDITIONAL TESTS:

## 2021-09-28 NOTE — DISCHARGE NOTE PROVIDER - HOSPITAL COURSE
31 y/o f with medical history of seizures (not on any meds), Alcohol abuse, myocarditis, pericarditis, presented to Ed with complains of shaking, nausea, vomiting and abdominal pain since 1 day. Pt states she has been drinking heavily since december since her divorce.  drinks 2 Pints of liquor every day , last drink ad day before she came in. She has been feeling very anxious and having palpitations on admission. She is very interested in getting detox and is motivated to stop drinking. She has had 4 alcohol withdrawal seizures this year. In childhood she has a couple of seizure, she was on antiseizure medication but stopped taking it in childhood as it made her feel bad. Pt does not want her family to know about her alcohol problem and does not want social work consulted. She reports improvement in her abdominal pain and nausea in the ED.    Hospital course:  She got admitted to medicine for alcohol withdrawal. Patient was in withdrawal for 3 days and was treated with benzos . Patient later was anxious and was asking for more ativa. Ativan was d/c. She also wanted to have to make her feel calm. She was evaluated and it was decided that she doesn't need any benzos at this point. Will need PSych evaluation as OP.   Given patient's improved clinical status and current hemodynamic stability, decision was made to discharge the patient.  Patient is stable for discharge per attending and is advised to follow up with PCP as outpatient  Please refer to patient's complete medical chart with documents for a full hospital course, for this is only a brief summary.

## 2021-09-28 NOTE — PROGRESS NOTE ADULT - PROBLEM SELECTOR PLAN 3
impoved after metoclopramide  monitor for now
Metoclopramide 10, Benadryl 25 for migraines  AVOID Keterolac due to complaint of dark emesis  f/u for response, avoid opioids.

## 2021-09-28 NOTE — DISCHARGE NOTE PROVIDER - NSDCCPCAREPLAN_GEN_ALL_CORE_FT
PRINCIPAL DISCHARGE DIAGNOSIS  Diagnosis: Alcohol withdrawal  Assessment and Plan of Treatment: You presented with symptoms of alcohol withdrawal, was treated with ativan for the symptoms. You are advised to stop drinking alcohol and abstain from it. You are advised to go to alcohol rehab for help from alcoholism. Please take vitamins and follow up with primary care doctor.      SECONDARY DISCHARGE DIAGNOSES  Diagnosis: Anxiety  Assessment and Plan of Treatment: You have symptoms of anxiety. You need evaluation from Psychiatrist as OP. Please call psychiatrist number to take an appointment.    Diagnosis: Depression  Assessment and Plan of Treatment: You have symptoms of mild depression while in hospital, which could be a part of undiagnosed psychiatric illness. You need evaluation by psychiatrist as OP. Please see psychiatrist after taking an appoinmnet as soon as possible.

## 2021-09-28 NOTE — PROGRESS NOTE ADULT - PROBLEM SELECTOR PLAN 2
AST/ALT elevated  US abdomen: No evidence for cholelithiasis or acute cholecystitis.  Hepatomegaly and hepatic steatosis.  Continue to monitor CMP daily  PT/INR wnl
AST/ALT elevated  US abdomen: No evidence for cholelithiasis or acute cholecystitis.  Hepatomegaly and hepatic steatosis.  Continue to monitor CMP daily  normal INR

## 2021-09-28 NOTE — DISCHARGE NOTE PROVIDER - CARE PROVIDERS DIRECT ADDRESSES
,[email or web address]_Unknown,[email or web address]

## 2021-09-28 NOTE — PROGRESS NOTE ADULT - ATTENDING COMMENTS
Patient reports increasing anxiety, diaphoresis, palpitations today, pacing in hallway. Abdomen soft. No further episodes of nausea, but patient also complaining of migraine. Will go back to previous Ativan 2mg IVP q6h with Ativan 2mg q2h PRN and proceed with slower taper. Can give metoclopramide and benadryl for migraine and may improve anxiety. Patient at 48 hours since last drink and has history of alcoholic seizures, will need close monitoring.  follow-up for rehab resources.
Patient seen/evaluated at bedside on 9/28/21. I agree with the resident progress note/outlined plan of care. My independent findings and conclusions are documented.    Approached patient in regards to symptom management. Ciwa noted to range between 1--9 in past 24 hours. Advised additional benzodiazepine on top of her ativan was not advised. Pt began to feel anxious during conversation with sweaty palms. Discussed her potential triggers and explored plans for post hospital discharge in regards to substance dependence management. Claims she plans on taking vitamins/teas and searching for her own therapist. Patient advised to seek formal substance dependence counseling    -additional dx: Continuous alcohol dependence  -taper ativan to 1mg q 8 hours today  -Case informally discussed with psychiatry--> recommended considering a trial of seroquel 25mg qhs and naltrexone with resolution of hepatitis  -check LFTs tomorrow  -social work intervention complete
29yo F PMHx of alcohol abuse presenting with alcohol withdrawal. Patient continues to have significant anxiety, diaphoresis, is tachycardic, tremors. Continue Ativan 2mg q6h with additional PRN. Monitor total use, patient at 72 hour from last drink. No further episodes of emesis, hemoglobin stable. Patient likely has underlying anxiety as well. Will add hydroxyzine 25mg BID and monitor response. Continue thiamine, folate supplementation.

## 2021-09-28 NOTE — CHART NOTE - NSCHARTNOTEFT_GEN_A_CORE
Attended page from RN, pt c/o HA. Evaluated pt at bedside, c/o HA localized to R fronto-temporal area, pounding sensation, associated with photosensitivity and increased sensitivity to loud noises, not associated with vision changes. States that occasionally when she experiences this type of HA  she takes medication for migraine, but does not recall the name. PE: unremarkable, no focal deficits. Order Toradol 15 mg IVP to be given once.
Patient requesting medication for anxiety, per chart review, patient is CIWA. Patient is exhibiting withdrawal symptoms, however patient does not want ativan.  Patient received 0.5 of xanax this a.m. which was affective, will give 0.5 xanax now

## 2021-09-28 NOTE — PROGRESS NOTE ADULT - ASSESSMENT
29 y/o f with medical history of seizures (not on any meds), Alcohol abuse, myocarditis, pericarditis, presented to Ed with complains of shaking, nausea, vomiting and abdominal pain since 1 day. Admitted for alcohol withdrawal   
29 y/o f with medical history of seizures (not on any meds), Alcohol abuse, myocarditis, pericarditis, presented to Ed with complains of shaking, nausea, vomiting and abdominal pain since 1 day. Admitted for alcohol withdrawal   
29 y/o f with medical history of seizures (not on any meds), Alcohol abuse, myocarditis, pericarditis, presented to Ed with complains of shaking, nausea, vomiting and abdominal pain since 1 day. Admitted for alcohol withdrawal     Confirm home meds  
31 y/o f with medical history of seizures (not on any meds), Alcohol abuse, myocarditis, pericarditis, presented to Ed with complains of shaking, nausea, vomiting and abdominal pain since 1 day. Admitted for alcohol withdrawal

## 2021-09-29 ENCOUNTER — TRANSCRIPTION ENCOUNTER (OUTPATIENT)
Age: 31
End: 2021-09-29

## 2021-09-29 VITALS
HEART RATE: 102 BPM | OXYGEN SATURATION: 98 % | DIASTOLIC BLOOD PRESSURE: 85 MMHG | RESPIRATION RATE: 18 BRPM | SYSTOLIC BLOOD PRESSURE: 140 MMHG | TEMPERATURE: 98 F

## 2021-09-29 LAB
ALBUMIN SERPL ELPH-MCNC: 3.8 G/DL — SIGNIFICANT CHANGE UP (ref 3.5–5)
ALP SERPL-CCNC: 75 U/L — SIGNIFICANT CHANGE UP (ref 40–120)
ALT FLD-CCNC: 130 U/L DA — HIGH (ref 10–60)
ANION GAP SERPL CALC-SCNC: 10 MMOL/L — SIGNIFICANT CHANGE UP (ref 5–17)
AST SERPL-CCNC: 100 U/L — HIGH (ref 10–40)
BILIRUB DIRECT SERPL-MCNC: 0.1 MG/DL — SIGNIFICANT CHANGE UP (ref 0–0.2)
BILIRUB INDIRECT FLD-MCNC: 0.4 MG/DL — SIGNIFICANT CHANGE UP (ref 0.2–1)
BILIRUB SERPL-MCNC: 0.5 MG/DL — SIGNIFICANT CHANGE UP (ref 0.2–1.2)
BUN SERPL-MCNC: 7 MG/DL — SIGNIFICANT CHANGE UP (ref 7–18)
CALCIUM SERPL-MCNC: 9.8 MG/DL — SIGNIFICANT CHANGE UP (ref 8.4–10.5)
CHLORIDE SERPL-SCNC: 101 MMOL/L — SIGNIFICANT CHANGE UP (ref 96–108)
CO2 SERPL-SCNC: 26 MMOL/L — SIGNIFICANT CHANGE UP (ref 22–31)
CREAT SERPL-MCNC: 0.68 MG/DL — SIGNIFICANT CHANGE UP (ref 0.5–1.3)
GLUCOSE SERPL-MCNC: 101 MG/DL — HIGH (ref 70–99)
HCT VFR BLD CALC: 37.5 % — SIGNIFICANT CHANGE UP (ref 34.5–45)
HGB BLD-MCNC: 12.7 G/DL — SIGNIFICANT CHANGE UP (ref 11.5–15.5)
MCHC RBC-ENTMCNC: 32.8 PG — SIGNIFICANT CHANGE UP (ref 27–34)
MCHC RBC-ENTMCNC: 33.9 GM/DL — SIGNIFICANT CHANGE UP (ref 32–36)
MCV RBC AUTO: 96.9 FL — SIGNIFICANT CHANGE UP (ref 80–100)
NRBC # BLD: 0 /100 WBCS — SIGNIFICANT CHANGE UP (ref 0–0)
PLATELET # BLD AUTO: 310 K/UL — SIGNIFICANT CHANGE UP (ref 150–400)
POTASSIUM SERPL-MCNC: 3.4 MMOL/L — LOW (ref 3.5–5.3)
POTASSIUM SERPL-SCNC: 3.4 MMOL/L — LOW (ref 3.5–5.3)
PROT SERPL-MCNC: 8.2 G/DL — SIGNIFICANT CHANGE UP (ref 6–8.3)
RBC # BLD: 3.87 M/UL — SIGNIFICANT CHANGE UP (ref 3.8–5.2)
RBC # FLD: 12 % — SIGNIFICANT CHANGE UP (ref 10.3–14.5)
SODIUM SERPL-SCNC: 137 MMOL/L — SIGNIFICANT CHANGE UP (ref 135–145)
WBC # BLD: 6.71 K/UL — SIGNIFICANT CHANGE UP (ref 3.8–10.5)
WBC # FLD AUTO: 6.71 K/UL — SIGNIFICANT CHANGE UP (ref 3.8–10.5)

## 2021-09-29 PROCEDURE — 87635 SARS-COV-2 COVID-19 AMP PRB: CPT

## 2021-09-29 PROCEDURE — 83690 ASSAY OF LIPASE: CPT

## 2021-09-29 PROCEDURE — 86769 SARS-COV-2 COVID-19 ANTIBODY: CPT

## 2021-09-29 PROCEDURE — 85730 THROMBOPLASTIN TIME PARTIAL: CPT

## 2021-09-29 PROCEDURE — 85610 PROTHROMBIN TIME: CPT

## 2021-09-29 PROCEDURE — 99285 EMERGENCY DEPT VISIT HI MDM: CPT

## 2021-09-29 PROCEDURE — 80307 DRUG TEST PRSMV CHEM ANLYZR: CPT

## 2021-09-29 PROCEDURE — 83735 ASSAY OF MAGNESIUM: CPT

## 2021-09-29 PROCEDURE — 85027 COMPLETE CBC AUTOMATED: CPT

## 2021-09-29 PROCEDURE — 84100 ASSAY OF PHOSPHORUS: CPT

## 2021-09-29 PROCEDURE — 80053 COMPREHEN METABOLIC PANEL: CPT

## 2021-09-29 PROCEDURE — 99239 HOSP IP/OBS DSCHRG MGMT >30: CPT | Mod: GC

## 2021-09-29 PROCEDURE — 80076 HEPATIC FUNCTION PANEL: CPT

## 2021-09-29 PROCEDURE — 80048 BASIC METABOLIC PNL TOTAL CA: CPT

## 2021-09-29 PROCEDURE — 93005 ELECTROCARDIOGRAM TRACING: CPT

## 2021-09-29 PROCEDURE — 76705 ECHO EXAM OF ABDOMEN: CPT

## 2021-09-29 PROCEDURE — 36415 COLL VENOUS BLD VENIPUNCTURE: CPT

## 2021-09-29 PROCEDURE — 81025 URINE PREGNANCY TEST: CPT

## 2021-09-29 PROCEDURE — 83605 ASSAY OF LACTIC ACID: CPT

## 2021-09-29 RX ORDER — POTASSIUM CHLORIDE 20 MEQ
40 PACKET (EA) ORAL ONCE
Refills: 0 | Status: COMPLETED | OUTPATIENT
Start: 2021-09-29 | End: 2021-09-29

## 2021-09-29 RX ORDER — THIAMINE MONONITRATE (VIT B1) 100 MG
1 TABLET ORAL
Qty: 10 | Refills: 0
Start: 2021-09-29 | End: 2021-10-08

## 2021-09-29 RX ORDER — FOLIC ACID 0.8 MG
1 TABLET ORAL
Qty: 10 | Refills: 0
Start: 2021-09-29 | End: 2021-10-08

## 2021-09-29 RX ADMIN — Medication 650 MILLIGRAM(S): at 05:27

## 2021-09-29 RX ADMIN — Medication 1 MILLIGRAM(S): at 05:27

## 2021-09-29 RX ADMIN — Medication 2 MILLIGRAM(S): at 03:31

## 2021-09-29 RX ADMIN — Medication 650 MILLIGRAM(S): at 06:30

## 2021-09-29 RX ADMIN — PANTOPRAZOLE SODIUM 40 MILLIGRAM(S): 20 TABLET, DELAYED RELEASE ORAL at 05:27

## 2021-09-29 RX ADMIN — Medication 40 MILLIEQUIVALENT(S): at 12:36

## 2021-09-29 NOTE — DISCHARGE NOTE NURSING/CASE MANAGEMENT/SOCIAL WORK - PATIENT PORTAL LINK FT
You can access the FollowMyHealth Patient Portal offered by [facility] by registering at the following website: [email or web address]. By joining [employer]’s FollowMyHealth portal, you will also be able to view your health information using other applications (apps) compatible with our system.

## 2021-10-09 ENCOUNTER — EMERGENCY (EMERGENCY)
Facility: HOSPITAL | Age: 31
LOS: 1 days | End: 2021-10-09
Attending: EMERGENCY MEDICINE
Payer: MEDICAID

## 2021-10-09 ENCOUNTER — TRANSCRIPTION ENCOUNTER (OUTPATIENT)
Age: 31
End: 2021-10-09

## 2021-10-09 ENCOUNTER — EMERGENCY (EMERGENCY)
Facility: HOSPITAL | Age: 31
LOS: 1 days | Discharge: HOME | End: 2021-10-09
Attending: EMERGENCY MEDICINE
Payer: MEDICAID

## 2021-10-09 VITALS
TEMPERATURE: 98 F | HEART RATE: 96 BPM | DIASTOLIC BLOOD PRESSURE: 90 MMHG | OXYGEN SATURATION: 98 % | SYSTOLIC BLOOD PRESSURE: 141 MMHG | RESPIRATION RATE: 18 BRPM

## 2021-10-09 VITALS
WEIGHT: 169.98 LBS | RESPIRATION RATE: 18 BRPM | OXYGEN SATURATION: 97 % | SYSTOLIC BLOOD PRESSURE: 141 MMHG | DIASTOLIC BLOOD PRESSURE: 104 MMHG | TEMPERATURE: 98 F | HEIGHT: 63 IN | HEART RATE: 99 BPM

## 2021-10-09 VITALS
SYSTOLIC BLOOD PRESSURE: 144 MMHG | HEART RATE: 106 BPM | OXYGEN SATURATION: 98 % | DIASTOLIC BLOOD PRESSURE: 112 MMHG | RESPIRATION RATE: 18 BRPM | TEMPERATURE: 98 F

## 2021-10-09 VITALS
DIASTOLIC BLOOD PRESSURE: 114 MMHG | HEIGHT: 63 IN | WEIGHT: 169.98 LBS | HEART RATE: 116 BPM | SYSTOLIC BLOOD PRESSURE: 158 MMHG | OXYGEN SATURATION: 96 % | TEMPERATURE: 98 F | RESPIRATION RATE: 18 BRPM

## 2021-10-09 DIAGNOSIS — Z98.890 OTHER SPECIFIED POSTPROCEDURAL STATES: Chronic | ICD-10-CM

## 2021-10-09 PROBLEM — R56.9 UNSPECIFIED CONVULSIONS: Chronic | Status: ACTIVE | Noted: 2021-09-24

## 2021-10-09 PROBLEM — F10.10 ALCOHOL ABUSE, UNCOMPLICATED: Chronic | Status: ACTIVE | Noted: 2021-09-24

## 2021-10-09 LAB
ACETONE SERPL-MCNC: NEGATIVE — SIGNIFICANT CHANGE UP
ALBUMIN SERPL ELPH-MCNC: 4.2 G/DL — SIGNIFICANT CHANGE UP (ref 3.5–5)
ALP SERPL-CCNC: 84 U/L — SIGNIFICANT CHANGE UP (ref 40–120)
ALT FLD-CCNC: 107 U/L DA — HIGH (ref 10–60)
ANION GAP SERPL CALC-SCNC: 11 MMOL/L — SIGNIFICANT CHANGE UP (ref 5–17)
AST SERPL-CCNC: 110 U/L — HIGH (ref 10–40)
BASOPHILS # BLD AUTO: 0.12 K/UL — SIGNIFICANT CHANGE UP (ref 0–0.2)
BASOPHILS NFR BLD AUTO: 1.9 % — SIGNIFICANT CHANGE UP (ref 0–2)
BILIRUB SERPL-MCNC: 0.3 MG/DL — SIGNIFICANT CHANGE UP (ref 0.2–1.2)
BUN SERPL-MCNC: 7 MG/DL — SIGNIFICANT CHANGE UP (ref 7–18)
CALCIUM SERPL-MCNC: 9.3 MG/DL — SIGNIFICANT CHANGE UP (ref 8.4–10.5)
CHLORIDE SERPL-SCNC: 102 MMOL/L — SIGNIFICANT CHANGE UP (ref 96–108)
CO2 SERPL-SCNC: 26 MMOL/L — SIGNIFICANT CHANGE UP (ref 22–31)
CREAT SERPL-MCNC: 0.53 MG/DL — SIGNIFICANT CHANGE UP (ref 0.5–1.3)
EOSINOPHIL # BLD AUTO: 0.13 K/UL — SIGNIFICANT CHANGE UP (ref 0–0.5)
EOSINOPHIL NFR BLD AUTO: 2 % — SIGNIFICANT CHANGE UP (ref 0–6)
ETHANOL SERPL-MCNC: 158 MG/DL — HIGH (ref 0–10)
GLUCOSE SERPL-MCNC: 90 MG/DL — SIGNIFICANT CHANGE UP (ref 70–99)
HCG SERPL-ACNC: <1 MIU/ML — SIGNIFICANT CHANGE UP
HCT VFR BLD CALC: 40.3 % — SIGNIFICANT CHANGE UP (ref 34.5–45)
HGB BLD-MCNC: 13.3 G/DL — SIGNIFICANT CHANGE UP (ref 11.5–15.5)
IMM GRANULOCYTES NFR BLD AUTO: 0.2 % — SIGNIFICANT CHANGE UP (ref 0–1.5)
LIDOCAIN IGE QN: 119 U/L — SIGNIFICANT CHANGE UP (ref 73–393)
LYMPHOCYTES # BLD AUTO: 2.26 K/UL — SIGNIFICANT CHANGE UP (ref 1–3.3)
LYMPHOCYTES # BLD AUTO: 35 % — SIGNIFICANT CHANGE UP (ref 13–44)
MAGNESIUM SERPL-MCNC: 1.9 MG/DL — SIGNIFICANT CHANGE UP (ref 1.6–2.6)
MCHC RBC-ENTMCNC: 31.7 PG — SIGNIFICANT CHANGE UP (ref 27–34)
MCHC RBC-ENTMCNC: 33 GM/DL — SIGNIFICANT CHANGE UP (ref 32–36)
MCV RBC AUTO: 96.2 FL — SIGNIFICANT CHANGE UP (ref 80–100)
MONOCYTES # BLD AUTO: 0.46 K/UL — SIGNIFICANT CHANGE UP (ref 0–0.9)
MONOCYTES NFR BLD AUTO: 7.1 % — SIGNIFICANT CHANGE UP (ref 2–14)
NEUTROPHILS # BLD AUTO: 3.48 K/UL — SIGNIFICANT CHANGE UP (ref 1.8–7.4)
NEUTROPHILS NFR BLD AUTO: 53.8 % — SIGNIFICANT CHANGE UP (ref 43–77)
NRBC # BLD: 0 /100 WBCS — SIGNIFICANT CHANGE UP (ref 0–0)
PLATELET # BLD AUTO: 423 K/UL — HIGH (ref 150–400)
POTASSIUM SERPL-MCNC: 3.9 MMOL/L — SIGNIFICANT CHANGE UP (ref 3.5–5.3)
POTASSIUM SERPL-SCNC: 3.9 MMOL/L — SIGNIFICANT CHANGE UP (ref 3.5–5.3)
PROT SERPL-MCNC: 8.6 G/DL — HIGH (ref 6–8.3)
RBC # BLD: 4.19 M/UL — SIGNIFICANT CHANGE UP (ref 3.8–5.2)
RBC # FLD: 12.1 % — SIGNIFICANT CHANGE UP (ref 10.3–14.5)
SODIUM SERPL-SCNC: 139 MMOL/L — SIGNIFICANT CHANGE UP (ref 135–145)
WBC # BLD: 6.46 K/UL — SIGNIFICANT CHANGE UP (ref 3.8–10.5)
WBC # FLD AUTO: 6.46 K/UL — SIGNIFICANT CHANGE UP (ref 3.8–10.5)

## 2021-10-09 PROCEDURE — 96375 TX/PRO/DX INJ NEW DRUG ADDON: CPT

## 2021-10-09 PROCEDURE — 99284 EMERGENCY DEPT VISIT MOD MDM: CPT

## 2021-10-09 PROCEDURE — 99283 EMERGENCY DEPT VISIT LOW MDM: CPT

## 2021-10-09 PROCEDURE — 84702 CHORIONIC GONADOTROPIN TEST: CPT

## 2021-10-09 PROCEDURE — 99284 EMERGENCY DEPT VISIT MOD MDM: CPT | Mod: 25

## 2021-10-09 PROCEDURE — 83690 ASSAY OF LIPASE: CPT

## 2021-10-09 PROCEDURE — 82009 KETONE BODYS QUAL: CPT

## 2021-10-09 PROCEDURE — 85025 COMPLETE CBC W/AUTO DIFF WBC: CPT

## 2021-10-09 PROCEDURE — 96365 THER/PROPH/DIAG IV INF INIT: CPT

## 2021-10-09 PROCEDURE — 80053 COMPREHEN METABOLIC PANEL: CPT

## 2021-10-09 PROCEDURE — 83735 ASSAY OF MAGNESIUM: CPT

## 2021-10-09 PROCEDURE — 80307 DRUG TEST PRSMV CHEM ANLYZR: CPT

## 2021-10-09 PROCEDURE — 36415 COLL VENOUS BLD VENIPUNCTURE: CPT

## 2021-10-09 RX ORDER — METOCLOPRAMIDE HCL 10 MG
10 TABLET ORAL ONCE
Refills: 0 | Status: COMPLETED | OUTPATIENT
Start: 2021-10-09 | End: 2021-10-09

## 2021-10-09 RX ORDER — SODIUM CHLORIDE 9 MG/ML
1000 INJECTION INTRAMUSCULAR; INTRAVENOUS; SUBCUTANEOUS
Refills: 0 | Status: DISCONTINUED | OUTPATIENT
Start: 2021-10-09 | End: 2021-10-13

## 2021-10-09 RX ORDER — DIPHENHYDRAMINE HCL 50 MG
25 CAPSULE ORAL ONCE
Refills: 0 | Status: COMPLETED | OUTPATIENT
Start: 2021-10-09 | End: 2021-10-09

## 2021-10-09 RX ORDER — FAMOTIDINE 10 MG/ML
20 INJECTION INTRAVENOUS ONCE
Refills: 0 | Status: COMPLETED | OUTPATIENT
Start: 2021-10-09 | End: 2021-10-09

## 2021-10-09 RX ORDER — SODIUM CHLORIDE 9 MG/ML
1000 INJECTION INTRAMUSCULAR; INTRAVENOUS; SUBCUTANEOUS ONCE
Refills: 0 | Status: COMPLETED | OUTPATIENT
Start: 2021-10-09 | End: 2021-10-09

## 2021-10-09 RX ORDER — IBUPROFEN 200 MG
600 TABLET ORAL ONCE
Refills: 0 | Status: COMPLETED | OUTPATIENT
Start: 2021-10-09 | End: 2021-10-09

## 2021-10-09 RX ADMIN — Medication 10 MILLIGRAM(S): at 18:55

## 2021-10-09 RX ADMIN — Medication 600 MILLIGRAM(S): at 16:06

## 2021-10-09 RX ADMIN — SODIUM CHLORIDE 1000 MILLILITER(S): 9 INJECTION INTRAMUSCULAR; INTRAVENOUS; SUBCUTANEOUS at 18:24

## 2021-10-09 RX ADMIN — FAMOTIDINE 20 MILLIGRAM(S): 10 INJECTION INTRAVENOUS at 18:24

## 2021-10-09 RX ADMIN — Medication 600 MILLIGRAM(S): at 16:37

## 2021-10-09 RX ADMIN — Medication 104 MILLIGRAM(S): at 18:25

## 2021-10-09 RX ADMIN — Medication 25 MILLIGRAM(S): at 18:24

## 2021-10-09 NOTE — ED PROVIDER NOTE - PROGRESS NOTE DETAILS
pt states she is feeling much better, HA improves, no tremors, Ox3, no ataxia, pt d/c with a male friend.  Pt was seen by JERMAN Blackwood, offered pt detox program today, rehab., also mental health services, pt declines all.  Pt left with getting her instruction

## 2021-10-09 NOTE — ED PROVIDER NOTE - NEUROLOGICAL, MLM
Alert and oriented, no focal deficits, no motor or sensory deficits. No tongue fasciculation. No hand tremors. Ambulating.

## 2021-10-09 NOTE — ED PROVIDER NOTE - OBJECTIVE STATEMENT
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 Patient was not getting full withdrawal treatment. Patient states she now has been drinking beer and liquor for the past few days and last drink was yesterday. Patient has complaint of shakes, sweats, anxiety, headache, photophobia, and vomited a few times last night. RUQ pain questionable duration. Patient states she went to urgent care this morning and was sent to ED for further workup. Patient was seen here earlier and was told she does not have any withdrawal symptoms. Patient not feeling better and returned. Patient denies any other complaints. NKDA.

## 2021-10-09 NOTE — ED PROVIDER NOTE - OBJECTIVE STATEMENT
31 year old female with PMHx of seizures and daily ETOH use presents to the ED from Urgent Care with complaints of "withdrawing" and having liver pain and left hand pain. Patient is stating that she needs detox and reports that she was told that her hand is inflamed which requires her to be admitted. Patient endorses that she drank ETOH yesterday, although was not drunk. Patient denies any vomiting, SI, HI, hallucinations, or delusions. Patient reports feeling very anxious and states that she has been unable to sleep and is tremulous. Patient denies any drug use. Patient was admitted recently for ETOH withdrawals, however left early at the time. Patient states that since she has returned today she should be readmitted for a detox. Patient denies all other acute complaints at this time. NKDA.

## 2021-10-09 NOTE — ED PROVIDER NOTE - PATIENT PORTAL LINK FT
You can access the FollowMyHealth Patient Portal offered by [facility] by registering at the following website: [email or web address]. By joining [employer]’s FollowMyHealth portal, you will also be able to view your health information using other applications (apps) compatible with our system.

## 2021-10-09 NOTE — SBIRT NOTE ADULT - NSSBIRTBRIEFINTDET_GEN_A_CORE
Pt admitted to heavy consumption of alcohol. pt was educated / counseled re alcohol. Pt declined active referral to treatment. She also declined substance abuse and mental health resources when offered

## 2021-10-09 NOTE — ED PROVIDER NOTE - CLINICAL SUMMARY MEDICAL DECISION MAKING FREE TEXT BOX
Patient w/ complaint of migraine headache and abdominal pain. Does not appear to have any withdrawal symptoms. Will get labs, give meds, and reassess.

## 2021-10-09 NOTE — ED PROVIDER NOTE - NSFOLLOWUPINSTRUCTIONS_ED_ALL_ED_FT
please see detox center. stop drinking alcohol. motrin 600mg every 6 hrs for hand pain.      Alcohol Use Disorder    WHAT YOU NEED TO KNOW:    Alcohol use disorder (AUD) is problem drinking. AUD includes alcohol abuse and alcohol dependence. Alcohol can damage your brain, heart, kidneys, lungs, and liver. Your risk of stroke is greater if you have 5 or more drinks each day. If you are pregnant, you and your baby are at risk for serious health problems. No amount of alcohol is safe during pregnancy.    DISCHARGE INSTRUCTIONS:    Call your local emergency number (911 in the [place]) if:   •You have seizures.          Call your doctor if:   •Your heart is beating faster than usual.      •You have hallucinations.      •You cannot remember what happens while you are drinking.      •You are anxious and have nausea.      •Your hands are shaky and you are sweating heavily.      •You have questions or concerns about your condition or care.      Follow up with your healthcare provider as directed: Do not try to stop drinking on your own. Your healthcare provider may need to help you withdraw from alcohol safely. He or she may need to admit you to the hospital. You may also need any of the following:  •Medicines to decrease your craving for alcohol      •Support groups such as Alcoholics Anonymous       •Therapy from a psychiatrist or psychologist       •Admission to an inpatient facility for treatment for severe AUD      For support and more information:   •Substance Abuse and Mental Health Services Administration  [address]  [place] [address]  Web Address: http://www.samhsa.gov      •Alcoholics Anonymous  Web Address: http://www.aa.org

## 2021-10-09 NOTE — ED PROVIDER NOTE - ENMT, MLM
Airway patent, Nasal mucosa clear. Mouth with normal mucosa. Throat has no vesicles, no oropharyngeal exudates and uvula is midline.  no tongue fasciculation

## 2021-10-09 NOTE — ED PROVIDER NOTE - CLINICAL SUMMARY MEDICAL DECISION MAKING FREE TEXT BOX
Patient with ETOH abuse disorder without any signs of withdrawals and a SIWA score of 0. No hepatic insult. Detox information given to patient. No signs of inflammatory or trauma process with regards to left hand. Offered patient to take Motrin for pain and to follow up outpatient.
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
## 2021-10-09 NOTE — ED PROVIDER NOTE - PROGRESS NOTE DETAILS
Patient is upset that we are not admitted her, and states that she is withdrawing. Based on symptoms, no clinical evidence of withdrawals. Patient is demanding to go see another physician, and was offered an alternative, however patient states that she lynnette be discharged and reregistered.

## 2021-10-09 NOTE — ED ADULT NURSE NOTE - OBJECTIVE STATEMENT
Pt states has alcohol withdrawl symptoms  abd pain, nausea , vomiting, tremors  Pt was just discharged min ago home with referrals for inpatient and outpatient rehab, and returned

## 2021-10-09 NOTE — ED PROVIDER NOTE - CARE PLAN
1 Principal Discharge DX:	Abdominal pain  Secondary Diagnosis:	Alcohol abuse  Secondary Diagnosis:	Migraine headache  Secondary Diagnosis:	Abnormal liver function
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
## 2021-10-09 NOTE — ED PROVIDER NOTE - MUSCULOSKELETAL, MLM
No increased warmth, erythema, deformities, or limitation in range of motion of the left hand. Patient states that she can not perform fist maneuver due to pain.

## 2021-10-09 NOTE — ED PROVIDER NOTE - PATIENT PORTAL LINK FT
You can access the FollowMyHealth Patient Portal offered by [facility] by registering at the following website: [email or web address]. By joining [employer]’s FollowMyHealth portal, you will also be able to view your health information using other applications (apps) compatible with our system.

## 2021-10-14 ENCOUNTER — TRANSCRIPTION ENCOUNTER (OUTPATIENT)
Age: 31
End: 2021-10-14

## 2021-10-31 ENCOUNTER — TRANSCRIPTION ENCOUNTER (OUTPATIENT)
Age: 31
End: 2021-10-31

## 2021-12-09 ENCOUNTER — TRANSCRIPTION ENCOUNTER (OUTPATIENT)
Age: 31
End: 2021-12-09

## 2022-01-08 ENCOUNTER — TRANSCRIPTION ENCOUNTER (OUTPATIENT)
Age: 32
End: 2022-01-08

## 2022-06-12 ENCOUNTER — NON-APPOINTMENT (OUTPATIENT)
Age: 32
End: 2022-06-12

## 2022-06-17 ENCOUNTER — NON-APPOINTMENT (OUTPATIENT)
Age: 32
End: 2022-06-17

## 2022-08-03 ENCOUNTER — NON-APPOINTMENT (OUTPATIENT)
Age: 32
End: 2022-08-03

## 2022-08-04 ENCOUNTER — NON-APPOINTMENT (OUTPATIENT)
Age: 32
End: 2022-08-04

## 2022-08-29 ENCOUNTER — NON-APPOINTMENT (OUTPATIENT)
Age: 32
End: 2022-08-29

## 2022-09-05 ENCOUNTER — NON-APPOINTMENT (OUTPATIENT)
Age: 32
End: 2022-09-05

## 2022-09-14 ENCOUNTER — NON-APPOINTMENT (OUTPATIENT)
Age: 32
End: 2022-09-14

## 2022-10-16 ENCOUNTER — NON-APPOINTMENT (OUTPATIENT)
Age: 32
End: 2022-10-16

## 2022-10-27 ENCOUNTER — NON-APPOINTMENT (OUTPATIENT)
Age: 32
End: 2022-10-27

## 2022-12-20 ENCOUNTER — NON-APPOINTMENT (OUTPATIENT)
Age: 32
End: 2022-12-20

## 2022-12-25 ENCOUNTER — NON-APPOINTMENT (OUTPATIENT)
Age: 32
End: 2022-12-25

## 2023-01-08 ENCOUNTER — NON-APPOINTMENT (OUTPATIENT)
Age: 33
End: 2023-01-08

## 2023-01-09 ENCOUNTER — EMERGENCY (EMERGENCY)
Facility: HOSPITAL | Age: 33
LOS: 1 days | Discharge: HOME | End: 2023-01-09
Attending: EMERGENCY MEDICINE
Payer: MEDICAID

## 2023-01-09 VITALS
TEMPERATURE: 98 F | HEART RATE: 91 BPM | SYSTOLIC BLOOD PRESSURE: 139 MMHG | OXYGEN SATURATION: 97 % | RESPIRATION RATE: 18 BRPM | DIASTOLIC BLOOD PRESSURE: 93 MMHG

## 2023-01-09 VITALS
OXYGEN SATURATION: 98 % | DIASTOLIC BLOOD PRESSURE: 132 MMHG | RESPIRATION RATE: 18 BRPM | HEIGHT: 63 IN | TEMPERATURE: 98 F | WEIGHT: 169.98 LBS | SYSTOLIC BLOOD PRESSURE: 146 MMHG | HEART RATE: 120 BPM

## 2023-01-09 DIAGNOSIS — Z98.890 OTHER SPECIFIED POSTPROCEDURAL STATES: Chronic | ICD-10-CM

## 2023-01-09 LAB
ALBUMIN SERPL ELPH-MCNC: 4.7 G/DL — SIGNIFICANT CHANGE UP (ref 3.5–5)
ALP SERPL-CCNC: 111 U/L — SIGNIFICANT CHANGE UP (ref 40–120)
ALT FLD-CCNC: 176 U/L DA — HIGH (ref 10–60)
ANION GAP SERPL CALC-SCNC: 23 MMOL/L — HIGH (ref 5–17)
APPEARANCE UR: CLEAR — SIGNIFICANT CHANGE UP
AST SERPL-CCNC: 182 U/L — HIGH (ref 10–40)
BACTERIA # UR AUTO: ABNORMAL /HPF
BASOPHILS # BLD AUTO: 0.1 K/UL — SIGNIFICANT CHANGE UP (ref 0–0.2)
BASOPHILS NFR BLD AUTO: 0.7 % — SIGNIFICANT CHANGE UP (ref 0–2)
BILIRUB SERPL-MCNC: 0.9 MG/DL — SIGNIFICANT CHANGE UP (ref 0.2–1.2)
BILIRUB UR-MCNC: NEGATIVE — SIGNIFICANT CHANGE UP
BUN SERPL-MCNC: 8 MG/DL — SIGNIFICANT CHANGE UP (ref 7–18)
CALCIUM SERPL-MCNC: 10.4 MG/DL — SIGNIFICANT CHANGE UP (ref 8.4–10.5)
CHLORIDE SERPL-SCNC: 96 MMOL/L — SIGNIFICANT CHANGE UP (ref 96–108)
CO2 SERPL-SCNC: 15 MMOL/L — LOW (ref 22–31)
COLOR SPEC: YELLOW — SIGNIFICANT CHANGE UP
CREAT SERPL-MCNC: 0.68 MG/DL — SIGNIFICANT CHANGE UP (ref 0.5–1.3)
DIFF PNL FLD: ABNORMAL
EGFR: 119 ML/MIN/1.73M2 — SIGNIFICANT CHANGE UP
EOSINOPHIL # BLD AUTO: 0 K/UL — SIGNIFICANT CHANGE UP (ref 0–0.5)
EOSINOPHIL NFR BLD AUTO: 0 % — SIGNIFICANT CHANGE UP (ref 0–6)
EPI CELLS # UR: SIGNIFICANT CHANGE UP /HPF
FLUAV AG NPH QL: SIGNIFICANT CHANGE UP
FLUBV AG NPH QL: SIGNIFICANT CHANGE UP
GLUCOSE SERPL-MCNC: 102 MG/DL — HIGH (ref 70–99)
GLUCOSE UR QL: NEGATIVE — SIGNIFICANT CHANGE UP
HCG UR QL: NEGATIVE — SIGNIFICANT CHANGE UP
HCT VFR BLD CALC: 44.6 % — SIGNIFICANT CHANGE UP (ref 34.5–45)
HGB BLD-MCNC: 14.3 G/DL — SIGNIFICANT CHANGE UP (ref 11.5–15.5)
IMM GRANULOCYTES NFR BLD AUTO: 0.4 % — SIGNIFICANT CHANGE UP (ref 0–0.9)
KETONES UR-MCNC: ABNORMAL
LACTATE SERPL-SCNC: 0.7 MMOL/L — SIGNIFICANT CHANGE UP (ref 0.7–2)
LEUKOCYTE ESTERASE UR-ACNC: NEGATIVE — SIGNIFICANT CHANGE UP
LIDOCAIN IGE QN: 60 U/L — LOW (ref 73–393)
LYMPHOCYTES # BLD AUTO: 0.95 K/UL — LOW (ref 1–3.3)
LYMPHOCYTES # BLD AUTO: 6.9 % — LOW (ref 13–44)
MCHC RBC-ENTMCNC: 31.8 PG — SIGNIFICANT CHANGE UP (ref 27–34)
MCHC RBC-ENTMCNC: 32.1 GM/DL — SIGNIFICANT CHANGE UP (ref 32–36)
MCV RBC AUTO: 99.3 FL — SIGNIFICANT CHANGE UP (ref 80–100)
MONOCYTES # BLD AUTO: 0.77 K/UL — SIGNIFICANT CHANGE UP (ref 0–0.9)
MONOCYTES NFR BLD AUTO: 5.6 % — SIGNIFICANT CHANGE UP (ref 2–14)
NEUTROPHILS # BLD AUTO: 11.81 K/UL — HIGH (ref 1.8–7.4)
NEUTROPHILS NFR BLD AUTO: 86.4 % — HIGH (ref 43–77)
NITRITE UR-MCNC: NEGATIVE — SIGNIFICANT CHANGE UP
NRBC # BLD: 0 /100 WBCS — SIGNIFICANT CHANGE UP (ref 0–0)
PH UR: 5 — SIGNIFICANT CHANGE UP (ref 5–8)
PLATELET # BLD AUTO: 378 K/UL — SIGNIFICANT CHANGE UP (ref 150–400)
POTASSIUM SERPL-MCNC: 4 MMOL/L — SIGNIFICANT CHANGE UP (ref 3.5–5.3)
POTASSIUM SERPL-SCNC: 4 MMOL/L — SIGNIFICANT CHANGE UP (ref 3.5–5.3)
PROT SERPL-MCNC: 9.5 G/DL — HIGH (ref 6–8.3)
PROT UR-MCNC: 100
RBC # BLD: 4.49 M/UL — SIGNIFICANT CHANGE UP (ref 3.8–5.2)
RBC # FLD: 12.9 % — SIGNIFICANT CHANGE UP (ref 10.3–14.5)
RBC CASTS # UR COMP ASSIST: SIGNIFICANT CHANGE UP /HPF (ref 0–2)
SARS-COV-2 RNA SPEC QL NAA+PROBE: SIGNIFICANT CHANGE UP
SODIUM SERPL-SCNC: 134 MMOL/L — LOW (ref 135–145)
SP GR SPEC: 1.02 — SIGNIFICANT CHANGE UP (ref 1.01–1.02)
UROBILINOGEN FLD QL: NEGATIVE — SIGNIFICANT CHANGE UP
WBC # BLD: 13.69 K/UL — HIGH (ref 3.8–10.5)
WBC # FLD AUTO: 13.69 K/UL — HIGH (ref 3.8–10.5)
WBC UR QL: SIGNIFICANT CHANGE UP /HPF (ref 0–5)

## 2023-01-09 PROCEDURE — 99285 EMERGENCY DEPT VISIT HI MDM: CPT

## 2023-01-09 PROCEDURE — 96374 THER/PROPH/DIAG INJ IV PUSH: CPT

## 2023-01-09 PROCEDURE — 81001 URINALYSIS AUTO W/SCOPE: CPT

## 2023-01-09 PROCEDURE — 93005 ELECTROCARDIOGRAM TRACING: CPT

## 2023-01-09 PROCEDURE — 83605 ASSAY OF LACTIC ACID: CPT

## 2023-01-09 PROCEDURE — 80053 COMPREHEN METABOLIC PANEL: CPT

## 2023-01-09 PROCEDURE — 99284 EMERGENCY DEPT VISIT MOD MDM: CPT | Mod: 25

## 2023-01-09 PROCEDURE — 85025 COMPLETE CBC W/AUTO DIFF WBC: CPT

## 2023-01-09 PROCEDURE — 81025 URINE PREGNANCY TEST: CPT

## 2023-01-09 PROCEDURE — 82962 GLUCOSE BLOOD TEST: CPT

## 2023-01-09 PROCEDURE — 96376 TX/PRO/DX INJ SAME DRUG ADON: CPT

## 2023-01-09 PROCEDURE — 36415 COLL VENOUS BLD VENIPUNCTURE: CPT

## 2023-01-09 PROCEDURE — 83690 ASSAY OF LIPASE: CPT

## 2023-01-09 PROCEDURE — 96375 TX/PRO/DX INJ NEW DRUG ADDON: CPT

## 2023-01-09 PROCEDURE — 93010 ELECTROCARDIOGRAM REPORT: CPT

## 2023-01-09 PROCEDURE — 87637 SARSCOV2&INF A&B&RSV AMP PRB: CPT

## 2023-01-09 RX ORDER — FAMOTIDINE 10 MG/ML
20 INJECTION INTRAVENOUS ONCE
Refills: 0 | Status: COMPLETED | OUTPATIENT
Start: 2023-01-09 | End: 2023-01-09

## 2023-01-09 RX ORDER — MORPHINE SULFATE 50 MG/1
4 CAPSULE, EXTENDED RELEASE ORAL ONCE
Refills: 0 | Status: DISCONTINUED | OUTPATIENT
Start: 2023-01-09 | End: 2023-01-09

## 2023-01-09 RX ORDER — ONDANSETRON 8 MG/1
1 TABLET, FILM COATED ORAL
Qty: 9 | Refills: 0
Start: 2023-01-09 | End: 2023-01-11

## 2023-01-09 RX ORDER — HALOPERIDOL DECANOATE 100 MG/ML
2.5 INJECTION INTRAMUSCULAR ONCE
Refills: 0 | Status: COMPLETED | OUTPATIENT
Start: 2023-01-09 | End: 2023-01-09

## 2023-01-09 RX ORDER — METOCLOPRAMIDE HCL 10 MG
1 TABLET ORAL
Qty: 6 | Refills: 0
Start: 2023-01-09 | End: 2023-01-10

## 2023-01-09 RX ORDER — SODIUM CHLORIDE 9 MG/ML
2000 INJECTION INTRAMUSCULAR; INTRAVENOUS; SUBCUTANEOUS ONCE
Refills: 0 | Status: COMPLETED | OUTPATIENT
Start: 2023-01-09 | End: 2023-01-09

## 2023-01-09 RX ORDER — FAMOTIDINE 10 MG/ML
1 INJECTION INTRAVENOUS
Qty: 7 | Refills: 0
Start: 2023-01-09 | End: 2023-01-15

## 2023-01-09 RX ADMIN — MORPHINE SULFATE 4 MILLIGRAM(S): 50 CAPSULE, EXTENDED RELEASE ORAL at 21:09

## 2023-01-09 RX ADMIN — Medication 2 MILLIGRAM(S): at 18:01

## 2023-01-09 RX ADMIN — HALOPERIDOL DECANOATE 2.5 MILLIGRAM(S): 100 INJECTION INTRAMUSCULAR at 18:02

## 2023-01-09 RX ADMIN — MORPHINE SULFATE 4 MILLIGRAM(S): 50 CAPSULE, EXTENDED RELEASE ORAL at 18:30

## 2023-01-09 RX ADMIN — MORPHINE SULFATE 4 MILLIGRAM(S): 50 CAPSULE, EXTENDED RELEASE ORAL at 18:01

## 2023-01-09 RX ADMIN — SODIUM CHLORIDE 2000 MILLILITER(S): 9 INJECTION INTRAMUSCULAR; INTRAVENOUS; SUBCUTANEOUS at 18:02

## 2023-01-09 RX ADMIN — FAMOTIDINE 20 MILLIGRAM(S): 10 INJECTION INTRAVENOUS at 21:08

## 2023-01-09 NOTE — ED ADULT NURSE NOTE - ED STAT RN HANDOFF DETAILS
Report given to JUAQUIN HERNANDEZ Pt resting in bed, no acute distress noted, denies chest pain, no SOB noted.

## 2023-01-09 NOTE — ED PROVIDER NOTE - PATIENT PORTAL LINK FT
You can access the FollowMyHealth Patient Portal offered by [facility] by registering at the following website: [email or web address]. By joining [employer]’s FollowMyHealth portal, you will also be able to view your health information using other applications (apps) compatible with our system.
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
## 2023-01-09 NOTE — ED PROVIDER NOTE - CARE PLAN
Principal Discharge DX:	Alcoholic gastritis  Secondary Diagnosis:	Alcohol dependence with withdrawal   1

## 2023-01-09 NOTE — ED ADULT NURSE NOTE - OBJECTIVE STATEMENT
Pt c/o abdominal pain with N/V x yesterday. No acute distress noted, denies chest pain, no SOB noted.
21-Jan-2021 00:15

## 2023-01-09 NOTE — ED ADULT NURSE NOTE - NSIMPLEMENTINTERV_GEN_ALL_ED
Implemented All Fall Risk Interventions:  [place] to call system. Call bell, personal items and telephone within reach. Instruct patient to call for assistance. Room bathroom lighting operational. Non-slip footwear when patient is off stretcher. Physically safe environment: no spills, clutter or unnecessary equipment. Stretcher in lowest position, wheels locked, appropriate side rails in place. Provide visual cue, wrist band, yellow gown, etc. Monitor gait and stability. Monitor for mental status changes and reorient to person, place, and time. Review medications for side effects contributing to fall risk. Reinforce activity limits and safety measures with patient and family.

## 2023-01-09 NOTE — ED PROVIDER NOTE - CLINICAL SUMMARY MEDICAL DECISION MAKING FREE TEXT BOX
Patient states she wants Dilaudid but she was informed that she is not getting that. Possible alcohol gastritis, will get Pepcid for gastritis. Avaitvan for possible alcohol withdrawal. On re-evaluation, patient says she feels much better and her vital signs have normalized. Her heart rate is 91 bpm, and blood pressure is 139/93 mmHg. Will discharge patient with a short course of Librium. She states she has not been drinking much and after yesterday, she feels better. She wants more pain medications and then wants to be discharged home. Patient states she wants Dilaudid but she was informed that she is not getting that. Possible alcohol gastritis, will get Pepcid for gastritis. Haloperidol and Ativan for possible alcohol withdrawal. On re-evaluation, patient says she feels much better and her vital signs have normalized. Her heart rate is 91 bpm, and blood pressure is 139/93 mmHg. Will discharge patient with a short course of Librium. She states she has not been drinking much and after yesterday, she feels better. She wants more pain medications and then wants to be discharged home. 32-year-old female with a history of alcohol use states is not is drinking as much as before but had her last drink yesterday comes in complaining of vomiting and abdominal pain since 3 AM.  Patient also states that she smokes marijuana occasionally.   Patient also complains of pain in her right little toe which she was told was fractured.  Urgent care center referred her for evaluation.  Physical exam the patient is anxious mildly tremulous tachycardic and hypertensive. abdomen soft non teder , right little toe tender , no ecchymosis or deformity ritika smith pt was evaluated for this complaint previously.  Labs were drawn anion gap noted and was hydrated.  for abdominal pain patient was given Pepcid.  Ativan for the  alcohol withdrawal and Haldol for the vomiting because the patient says that she has taken Zofran at home and it does not work.  Upon reevaluation patient states she feels much better but still has some mild burning in the epigastric area tremors are improved and VSS are normalized  No further vomiting in the ED.  Will DC the patient on Pepcid short course of Librium.   refer to pMD and outpatient detox

## 2023-02-07 ENCOUNTER — NON-APPOINTMENT (OUTPATIENT)
Age: 33
End: 2023-02-07

## 2025-03-14 ENCOUNTER — EMERGENCY (EMERGENCY)
Facility: HOSPITAL | Age: 35
LOS: 1 days | Discharge: HOME | End: 2025-03-14
Attending: EMERGENCY MEDICINE
Payer: MEDICAID

## 2025-03-14 VITALS
WEIGHT: 137.35 LBS | RESPIRATION RATE: 18 BRPM | OXYGEN SATURATION: 98 % | HEIGHT: 63 IN | SYSTOLIC BLOOD PRESSURE: 121 MMHG | DIASTOLIC BLOOD PRESSURE: 81 MMHG | HEART RATE: 104 BPM | TEMPERATURE: 99 F

## 2025-03-14 DIAGNOSIS — Z98.890 OTHER SPECIFIED POSTPROCEDURAL STATES: Chronic | ICD-10-CM

## 2025-03-14 PROCEDURE — 99283 EMERGENCY DEPT VISIT LOW MDM: CPT

## 2025-03-14 PROCEDURE — 99282 EMERGENCY DEPT VISIT SF MDM: CPT

## 2025-03-14 RX ORDER — LEVETIRACETAM 10 MG/ML
500 INJECTION, SOLUTION INTRAVENOUS ONCE
Refills: 0 | Status: COMPLETED | OUTPATIENT
Start: 2025-03-14 | End: 2025-03-14

## 2025-03-14 RX ORDER — LEVETIRACETAM 10 MG/ML
500 INJECTION, SOLUTION INTRAVENOUS ONCE
Refills: 0 | Status: DISCONTINUED | OUTPATIENT
Start: 2025-03-14 | End: 2025-03-14

## 2025-03-14 RX ORDER — KETOROLAC TROMETHAMINE 30 MG/ML
15 INJECTION, SOLUTION INTRAMUSCULAR; INTRAVENOUS ONCE
Refills: 0 | Status: DISCONTINUED | OUTPATIENT
Start: 2025-03-14 | End: 2025-03-14

## 2025-03-14 NOTE — ED ADULT NURSE NOTE - NSFALLRISKINTERV_ED_ALL_ED

## 2025-03-14 NOTE — ED PROVIDER NOTE - PHYSICAL EXAMINATION
Exam:  General: Patient well appearing, vital signs within normal limits  HEENT: airway patent with moist mucous membranes  Cardiac: Regular tachycardia S1/S2 with strong peripheral pulses  Respiratory: lungs clear without respiratory distress  GI: abdomen soft, non tender, non distended  Neuro: no gross neurologic deficits, ambulatory under own power  Ext: no defomity, old appearing bruising to R arm and R shin  Skin: warm, well perfused  Psych: normal mood and affect

## 2025-03-14 NOTE — ED PROVIDER NOTE - CLINICAL SUMMARY MEDICAL DECISION MAKING FREE TEXT BOX
Discussed with patient that I am happy to offer her Toradol for pain but would not be administering any stronger pain medications as I do not believe this is indicated for the injuries that she is demonstrating.  Will administer IV Keppra and IV fluids in the emergency department tonight.  After medications administered I anticipate patient can be discharged to home.
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
## 2025-03-14 NOTE — ED PROVIDER NOTE - PROGRESS NOTE DETAILS
Patient continued to be agitated in the emergency department refusing to speak with registration staff refusing to cooperate with nursing staff to administer Keppra or IV fluids.  Patient is currently awake alert and ambulatory under her own power.  Required security response as she was screaming in the middle of the emergency department and had to be escorted back to her room.  In the absence of an acute life-threatening emergent medical issue at this time patient will be discharged and escorted off the premises with security

## 2025-03-14 NOTE — ED PROVIDER NOTE - PATIENT PORTAL LINK FT
You can access the FollowMyHealth Patient Portal offered by [facility] by registering at the following website: [email or web address]. By joining [employer]’s FollowMyHealth portal, you will also be able to view your health information using other applications (apps) compatible with our system.
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
## 2025-03-14 NOTE — ED ADULT TRIAGE NOTE - NS ED NURSE AMBULANCES
[facility], Ambulance Department
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
